# Patient Record
Sex: FEMALE | Race: WHITE | NOT HISPANIC OR LATINO | URBAN - METROPOLITAN AREA
[De-identification: names, ages, dates, MRNs, and addresses within clinical notes are randomized per-mention and may not be internally consistent; named-entity substitution may affect disease eponyms.]

---

## 2017-02-20 ENCOUNTER — OUTPATIENT (OUTPATIENT)
Dept: OUTPATIENT SERVICES | Facility: HOSPITAL | Age: 82
LOS: 1 days | Discharge: HOME | End: 2017-02-20

## 2017-06-27 DIAGNOSIS — R79.89 OTHER SPECIFIED ABNORMAL FINDINGS OF BLOOD CHEMISTRY: ICD-10-CM

## 2017-06-27 DIAGNOSIS — D50.9 IRON DEFICIENCY ANEMIA, UNSPECIFIED: ICD-10-CM

## 2017-06-27 DIAGNOSIS — E78.00 PURE HYPERCHOLESTEROLEMIA, UNSPECIFIED: ICD-10-CM

## 2017-06-27 DIAGNOSIS — E55.9 VITAMIN D DEFICIENCY, UNSPECIFIED: ICD-10-CM

## 2018-04-11 ENCOUNTER — OUTPATIENT (OUTPATIENT)
Dept: OUTPATIENT SERVICES | Facility: HOSPITAL | Age: 83
LOS: 1 days | Discharge: HOME | End: 2018-04-11

## 2018-04-11 DIAGNOSIS — D64.9 ANEMIA, UNSPECIFIED: ICD-10-CM

## 2018-04-11 DIAGNOSIS — R79.89 OTHER SPECIFIED ABNORMAL FINDINGS OF BLOOD CHEMISTRY: ICD-10-CM

## 2018-04-11 DIAGNOSIS — E55.9 VITAMIN D DEFICIENCY, UNSPECIFIED: ICD-10-CM

## 2018-04-11 DIAGNOSIS — E11.65 TYPE 2 DIABETES MELLITUS WITH HYPERGLYCEMIA: ICD-10-CM

## 2018-04-11 DIAGNOSIS — E78.2 MIXED HYPERLIPIDEMIA: ICD-10-CM

## 2018-04-11 DIAGNOSIS — E03.9 HYPOTHYROIDISM, UNSPECIFIED: ICD-10-CM

## 2018-04-11 DIAGNOSIS — E53.8 DEFICIENCY OF OTHER SPECIFIED B GROUP VITAMINS: ICD-10-CM

## 2018-06-04 ENCOUNTER — INPATIENT (INPATIENT)
Facility: HOSPITAL | Age: 83
LOS: 0 days | Discharge: HOME HEALTH | End: 2018-06-04
Attending: INTERNAL MEDICINE | Admitting: INTERNAL MEDICINE

## 2018-06-04 VITALS
OXYGEN SATURATION: 96 % | WEIGHT: 145.95 LBS | RESPIRATION RATE: 18 BRPM | SYSTOLIC BLOOD PRESSURE: 137 MMHG | TEMPERATURE: 97 F | HEART RATE: 95 BPM | DIASTOLIC BLOOD PRESSURE: 60 MMHG

## 2018-06-04 VITALS — SYSTOLIC BLOOD PRESSURE: 140 MMHG | DIASTOLIC BLOOD PRESSURE: 67 MMHG

## 2018-06-04 LAB
ALBUMIN SERPL ELPH-MCNC: 3.6 G/DL — SIGNIFICANT CHANGE UP (ref 3.5–5.2)
ALP SERPL-CCNC: 52 U/L — SIGNIFICANT CHANGE UP (ref 30–115)
ALT FLD-CCNC: 14 U/L — SIGNIFICANT CHANGE UP (ref 0–41)
ANION GAP SERPL CALC-SCNC: 12 MMOL/L — SIGNIFICANT CHANGE UP (ref 7–14)
APTT BLD: 29.5 SEC — SIGNIFICANT CHANGE UP (ref 27–39.2)
AST SERPL-CCNC: 26 U/L — SIGNIFICANT CHANGE UP (ref 0–41)
BASOPHILS # BLD AUTO: 0.06 K/UL — SIGNIFICANT CHANGE UP (ref 0–0.2)
BASOPHILS NFR BLD AUTO: 0.9 % — SIGNIFICANT CHANGE UP (ref 0–1)
BILIRUB DIRECT SERPL-MCNC: <0.2 MG/DL — SIGNIFICANT CHANGE UP (ref 0–0.2)
BILIRUB INDIRECT FLD-MCNC: >0.2 MG/DL — SIGNIFICANT CHANGE UP (ref 0.2–1.2)
BILIRUB SERPL-MCNC: 0.4 MG/DL — SIGNIFICANT CHANGE UP (ref 0.2–1.2)
BUN SERPL-MCNC: 34 MG/DL — HIGH (ref 10–20)
CALCIUM SERPL-MCNC: 9.8 MG/DL — SIGNIFICANT CHANGE UP (ref 8.5–10.1)
CHLORIDE SERPL-SCNC: 102 MMOL/L — SIGNIFICANT CHANGE UP (ref 98–110)
CO2 SERPL-SCNC: 29 MMOL/L — SIGNIFICANT CHANGE UP (ref 17–32)
CREAT SERPL-MCNC: 1.5 MG/DL — SIGNIFICANT CHANGE UP (ref 0.7–1.5)
EOSINOPHIL # BLD AUTO: 0.24 K/UL — SIGNIFICANT CHANGE UP (ref 0–0.7)
EOSINOPHIL NFR BLD AUTO: 3.6 % — SIGNIFICANT CHANGE UP (ref 0–8)
GLUCOSE SERPL-MCNC: 91 MG/DL — SIGNIFICANT CHANGE UP (ref 70–99)
HCT VFR BLD CALC: 52.7 % — HIGH (ref 37–47)
HGB BLD-MCNC: 17.4 G/DL — HIGH (ref 12–16)
IMM GRANULOCYTES NFR BLD AUTO: 0.4 % — HIGH (ref 0.1–0.3)
INR BLD: 1 RATIO — SIGNIFICANT CHANGE UP (ref 0.65–1.3)
LYMPHOCYTES # BLD AUTO: 1.7 K/UL — SIGNIFICANT CHANGE UP (ref 1.2–3.4)
LYMPHOCYTES # BLD AUTO: 25.4 % — SIGNIFICANT CHANGE UP (ref 20.5–51.1)
MCHC RBC-ENTMCNC: 31.6 PG — HIGH (ref 27–31)
MCHC RBC-ENTMCNC: 33 G/DL — SIGNIFICANT CHANGE UP (ref 32–37)
MCV RBC AUTO: 95.8 FL — SIGNIFICANT CHANGE UP (ref 81–99)
MONOCYTES # BLD AUTO: 0.57 K/UL — SIGNIFICANT CHANGE UP (ref 0.1–0.6)
MONOCYTES NFR BLD AUTO: 8.5 % — SIGNIFICANT CHANGE UP (ref 1.7–9.3)
NEUTROPHILS # BLD AUTO: 4.1 K/UL — SIGNIFICANT CHANGE UP (ref 1.4–6.5)
NEUTROPHILS NFR BLD AUTO: 61.2 % — SIGNIFICANT CHANGE UP (ref 42.2–75.2)
NRBC # BLD: 0 /100 WBCS — SIGNIFICANT CHANGE UP (ref 0–0)
PLATELET # BLD AUTO: 172 K/UL — SIGNIFICANT CHANGE UP (ref 130–400)
POTASSIUM SERPL-MCNC: 4.2 MMOL/L — SIGNIFICANT CHANGE UP (ref 3.5–5)
POTASSIUM SERPL-SCNC: 4.2 MMOL/L — SIGNIFICANT CHANGE UP (ref 3.5–5)
PROT SERPL-MCNC: 7 G/DL — SIGNIFICANT CHANGE UP (ref 6–8)
PROTHROM AB SERPL-ACNC: 10.8 SEC — SIGNIFICANT CHANGE UP (ref 9.95–12.87)
RBC # BLD: 5.5 M/UL — HIGH (ref 4.2–5.4)
RBC # FLD: 13.2 % — SIGNIFICANT CHANGE UP (ref 11.5–14.5)
SODIUM SERPL-SCNC: 143 MMOL/L — SIGNIFICANT CHANGE UP (ref 135–146)
WBC # BLD: 6.7 K/UL — SIGNIFICANT CHANGE UP (ref 4.8–10.8)
WBC # FLD AUTO: 6.7 K/UL — SIGNIFICANT CHANGE UP (ref 4.8–10.8)

## 2018-06-04 RX ORDER — MORPHINE SULFATE 50 MG/1
6 CAPSULE, EXTENDED RELEASE ORAL ONCE
Qty: 0 | Refills: 0 | Status: DISCONTINUED | OUTPATIENT
Start: 2018-06-04 | End: 2018-06-04

## 2018-06-04 RX ORDER — SODIUM CHLORIDE 9 MG/ML
1000 INJECTION, SOLUTION INTRAVENOUS ONCE
Qty: 0 | Refills: 0 | Status: COMPLETED | OUTPATIENT
Start: 2018-06-04 | End: 2018-06-04

## 2018-06-04 RX ORDER — HYDROMORPHONE HYDROCHLORIDE 2 MG/ML
0.5 INJECTION INTRAMUSCULAR; INTRAVENOUS; SUBCUTANEOUS ONCE
Qty: 0 | Refills: 0 | Status: DISCONTINUED | OUTPATIENT
Start: 2018-06-04 | End: 2018-06-04

## 2018-06-04 RX ADMIN — SODIUM CHLORIDE 2000 MILLILITER(S): 9 INJECTION, SOLUTION INTRAVENOUS at 10:55

## 2018-06-04 NOTE — ED PROVIDER NOTE - PHYSICAL EXAMINATION
VITAL SIGNS: I have reviewed nursing notes and confirm.  CONSTITUTIONAL: Elderly female laying on stretcher in NAD.   SKIN: Skin exam is warm and dry, no acute rash.  HEAD: Normocephalic.  EYES: PERRL, EOM intact; conjunctiva and sclera clear.  ENT: No nasal discharge; airway clear. TMs clear. (+) swelling and ecchymosis to nose. No septal hematoma.   NECK: Supple; non tender.  CARD: S1, S2 normal; no murmurs, gallops, or rubs. Regular rate and rhythm.  RESP: No wheezes, rales or rhonchi. Speaking in full sentences.   ABD: Normal bowel sounds; soft; non-distended; non-tender; No rebound or guarding.   EXT: Pelvis stable. (+) mild TTP to left knee without deformity, ecchymosis or erythema. FROM. Radial and DP pulse 2+.   NEURO: Alert, oriented. Grossly unremarkable. No focal deficits. CN II-XII intact. No dysmetria. Strength 5/5 throughout. Sensation intact.

## 2018-06-04 NOTE — ED PROVIDER NOTE - OBJECTIVE STATEMENT
86 yo F with PMHx of HTN, aFib, DM, and HLD presents to the ED s/p fall out of bed. Pt states she was dreaming and was reaching for something and ended up falling out of bed onto her face. Pt was unable to get up after calling and used her life alert to notify EMS. Pt denies fever, chills, nausea, vomiting, abdominal pain, diarrhea, headache, dizziness, chest pain, SOB, back pain, LOC, urinary symptoms, cough. 88 yo F with PMHx of HTN, A Fib, DM, and HLD presents to the ED s/p fall out of bed. Pt states she was dreaming and was reaching for something and ended up falling out of bed onto her face. Pt was unable to get up after calling and used her life alert to notify EMS. Pt denies fever, chills, nausea, vomiting, abdominal pain, diarrhea, headache, dizziness, chest pain, SOB, back pain, LOC, urinary symptoms, cough.

## 2018-06-04 NOTE — ED PROVIDER NOTE - MEDICAL DECISION MAKING DETAILS
I personally evaluated the patient. I reviewed the Resident’s or Physician Assistant’s note (as assigned above), and agree with the findings and plan except as documented in my note.  Chart reviewed. H/O DM, hypothyroid, afib, fell at home, states she was reaching for a child. Exam shows alert patient in no distress, HEENT swollen deformed nose with abrasion, neck non-tender, lungs clear, RR S1S2, abdomen soft NT +BS, +tenderness left shoulder, neuro A&OX3 no deficits. Will order pan CT scan,  CXR and re-assess.

## 2018-06-04 NOTE — ED ADULT NURSE NOTE - OBJECTIVE STATEMENT
patient states she was having a dream and leaned over and fell out of bed.  She hit her face and now has swelling on her nose.

## 2018-06-04 NOTE — ED PROVIDER NOTE - ATTENDING CONTRIBUTION TO CARE
See MDM section above for attending physician contribution to care. Patient s/p trip and fall.  Patient has nasal swelling and ecchymosis.  Trauma work up done by overnight team and labs sent.  Patient lives home alone and she is unable to ambulate in the ED.  She has knee pain. She is an unsafe discharge. Patient has [employer].    Patient will require admission for rehab evaluation and .  Dr. Marroquin evaluated patient at bedside and accepts admission. Patient s/p trip and fall.  Patient has nasal swelling and ecchymosis.  Trauma work up done by overnight team and labs sent.  Patient lives home alone and she is unable to ambulate in the ED.  She has knee pain. Patient has [employer]. Patient s/p trip and fall.  Patient has nasal swelling and ecchymosis.  Trauma work up done by overnight team and labs sent. Patient s/p trip and fall.  Patient has nasal swelling and ecchymosis.  Trauma work up done by overnight team and labs sent.    Patient lives home alone.  She has fired multiple home health aids.  At 7 am, patient was able to stand but concern for her walking.  Also concern for her home living situation.      Rehab consult placed.  I was able to get her next of kin number (Ronal Melendez - [phone]).  I spoke with Ronal in detail about ED work up.  He sees patient multiple times a week and is arranging Home Aid for several hours several times a week.    Patient brought over to rehab for evaluation.  Patient able to walk without issue with walker.  Initially patient was going to be admitted, but patient wants to go home.  Given the additional information from Ronal and Home Services (Meals on Wheels, impending aid), I believe best place for patient is to be at home.      I personally evaluated patient. I agree with the findings and plan with all documentation on chart except as documented  in my note.    Full DC instructions discussed and patient/Ronal knows when to seek immediate medical attention.  Patient has proper follow up with her PMD.  All results discussed and patient aware they may require further work up.  All questions and concerns from patient or family addressed. Understanding of instructions verbalized.

## 2018-06-04 NOTE — ED PROVIDER NOTE - PROGRESS NOTE DETAILS
Patient lives alone, risk to fall. Unsafe discharge. Will admit. Pt unable to ambulate, lives alone, frequent falls, unsafe discharge. Signed out to Dr. Romo. Dispo pending.

## 2018-06-04 NOTE — ED ADULT NURSE NOTE - PMH
Edema of both ankles  3 plus pitting  Edema of both legs  3 plus pitting  Shoulder pain, unspecified chronicity, unspecified laterality  left side as per patient

## 2018-06-04 NOTE — ED PROVIDER NOTE - CARE PLAN
Principal Discharge DX:	Inadequate social support  Secondary Diagnosis:	Unable to ambulate  Secondary Diagnosis:	Fall, initial encounter Principal Discharge DX:	Fall, initial encounter  Secondary Diagnosis:	Contusion of nose, initial encounter

## 2018-06-07 DIAGNOSIS — I10 ESSENTIAL (PRIMARY) HYPERTENSION: ICD-10-CM

## 2018-06-07 DIAGNOSIS — S00.33XA CONTUSION OF NOSE, INITIAL ENCOUNTER: ICD-10-CM
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
## 2018-06-07 DIAGNOSIS — I48.91 UNSPECIFIED ATRIAL FIBRILLATION: ICD-10-CM

## 2018-06-07 DIAGNOSIS — E11.9 TYPE 2 DIABETES MELLITUS WITHOUT COMPLICATIONS: ICD-10-CM
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
## 2018-06-07 DIAGNOSIS — Z04.3 ENCOUNTER FOR EXAMINATION AND OBSERVATION FOLLOWING OTHER ACCIDENT: ICD-10-CM

## 2018-06-07 DIAGNOSIS — E78.5 HYPERLIPIDEMIA, UNSPECIFIED: ICD-10-CM
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
## 2018-07-31 ENCOUNTER — INPATIENT (INPATIENT)
Facility: HOSPITAL | Age: 83
LOS: 7 days | Discharge: NURSING, REHAB OR LONG-TERM CARE | End: 2018-08-08
Attending: INTERNAL MEDICINE | Admitting: INTERNAL MEDICINE
Payer: MEDICARE

## 2018-07-31 VITALS
DIASTOLIC BLOOD PRESSURE: 63 MMHG | HEART RATE: 59 BPM | HEIGHT: 68 IN | RESPIRATION RATE: 18 BRPM | WEIGHT: 160.06 LBS | SYSTOLIC BLOOD PRESSURE: 107 MMHG | OXYGEN SATURATION: 92 %

## 2018-07-31 DIAGNOSIS — I10 ESSENTIAL (PRIMARY) HYPERTENSION: ICD-10-CM

## 2018-07-31 DIAGNOSIS — R41.0 DISORIENTATION, UNSPECIFIED: ICD-10-CM

## 2018-07-31 DIAGNOSIS — R60.0 LOCALIZED EDEMA: ICD-10-CM

## 2018-07-31 PROBLEM — M25.519 PAIN IN UNSPECIFIED SHOULDER: Chronic | Status: ACTIVE | Noted: 2018-06-04

## 2018-07-31 LAB
ALBUMIN SERPL ELPH-MCNC: 3.3 G/DL — LOW (ref 3.5–5.2)
ALP SERPL-CCNC: 47 U/L — SIGNIFICANT CHANGE UP (ref 30–115)
ALT FLD-CCNC: 9 U/L — SIGNIFICANT CHANGE UP (ref 0–41)
AMMONIA BLD-MCNC: 16 UMOL/L — SIGNIFICANT CHANGE UP (ref 11–55)
ANION GAP SERPL CALC-SCNC: 12 MMOL/L — SIGNIFICANT CHANGE UP (ref 7–14)
APPEARANCE UR: ABNORMAL
APTT BLD: 24.6 SEC — LOW (ref 27–39.2)
AST SERPL-CCNC: 21 U/L — SIGNIFICANT CHANGE UP (ref 0–41)
BACTERIA # UR AUTO: ABNORMAL
BASOPHILS # BLD AUTO: 0.07 K/UL — SIGNIFICANT CHANGE UP (ref 0–0.2)
BASOPHILS NFR BLD AUTO: 1 % — SIGNIFICANT CHANGE UP (ref 0–1)
BILIRUB DIRECT SERPL-MCNC: <0.2 MG/DL — SIGNIFICANT CHANGE UP (ref 0–0.2)
BILIRUB INDIRECT FLD-MCNC: >0.3 MG/DL — SIGNIFICANT CHANGE UP (ref 0.2–1.2)
BILIRUB SERPL-MCNC: 0.5 MG/DL — SIGNIFICANT CHANGE UP (ref 0.2–1.2)
BILIRUB UR-MCNC: NEGATIVE — SIGNIFICANT CHANGE UP
BUN SERPL-MCNC: 29 MG/DL — HIGH (ref 10–20)
CALCIUM SERPL-MCNC: 9.8 MG/DL — SIGNIFICANT CHANGE UP (ref 8.5–10.1)
CHLORIDE SERPL-SCNC: 102 MMOL/L — SIGNIFICANT CHANGE UP (ref 98–110)
CK SERPL-CCNC: 28 U/L — SIGNIFICANT CHANGE UP (ref 0–225)
CO2 SERPL-SCNC: 28 MMOL/L — SIGNIFICANT CHANGE UP (ref 17–32)
COD CRY URNS QL: NEGATIVE — SIGNIFICANT CHANGE UP
COLOR SPEC: YELLOW — SIGNIFICANT CHANGE UP
CREAT SERPL-MCNC: 1.3 MG/DL — SIGNIFICANT CHANGE UP (ref 0.7–1.5)
DIFF PNL FLD: ABNORMAL
EOSINOPHIL # BLD AUTO: 0.23 K/UL — SIGNIFICANT CHANGE UP (ref 0–0.7)
EOSINOPHIL NFR BLD AUTO: 3.2 % — SIGNIFICANT CHANGE UP (ref 0–8)
EPI CELLS # UR: ABNORMAL /HPF
GLUCOSE SERPL-MCNC: 110 MG/DL — HIGH (ref 70–99)
GLUCOSE UR QL: NEGATIVE MG/DL — SIGNIFICANT CHANGE UP
GRAN CASTS # UR COMP ASSIST: NEGATIVE — SIGNIFICANT CHANGE UP
HCT VFR BLD CALC: 47.3 % — HIGH (ref 37–47)
HGB BLD-MCNC: 15.7 G/DL — SIGNIFICANT CHANGE UP (ref 12–16)
HYALINE CASTS # UR AUTO: NEGATIVE — SIGNIFICANT CHANGE UP
IMM GRANULOCYTES NFR BLD AUTO: 0.6 % — HIGH (ref 0.1–0.3)
INR BLD: 1.1 RATIO — SIGNIFICANT CHANGE UP (ref 0.65–1.3)
KETONES UR-MCNC: NEGATIVE — SIGNIFICANT CHANGE UP
LACTATE SERPL-SCNC: 1.2 MMOL/L — SIGNIFICANT CHANGE UP (ref 0.5–2.2)
LEUKOCYTE ESTERASE UR-ACNC: ABNORMAL
LIDOCAIN IGE QN: 31 U/L — SIGNIFICANT CHANGE UP (ref 7–60)
LYMPHOCYTES # BLD AUTO: 0.66 K/UL — LOW (ref 1.2–3.4)
LYMPHOCYTES # BLD AUTO: 9.3 % — LOW (ref 20.5–51.1)
MCHC RBC-ENTMCNC: 32.1 PG — HIGH (ref 27–31)
MCHC RBC-ENTMCNC: 33.2 G/DL — SIGNIFICANT CHANGE UP (ref 32–37)
MCV RBC AUTO: 96.7 FL — SIGNIFICANT CHANGE UP (ref 81–99)
MONOCYTES # BLD AUTO: 0.63 K/UL — HIGH (ref 0.1–0.6)
MONOCYTES NFR BLD AUTO: 8.9 % — SIGNIFICANT CHANGE UP (ref 1.7–9.3)
NEUTROPHILS # BLD AUTO: 5.47 K/UL — SIGNIFICANT CHANGE UP (ref 1.4–6.5)
NEUTROPHILS NFR BLD AUTO: 77 % — HIGH (ref 42.2–75.2)
NITRITE UR-MCNC: NEGATIVE — SIGNIFICANT CHANGE UP
NRBC # BLD: 0 /100 WBCS — SIGNIFICANT CHANGE UP (ref 0–0)
PH UR: 6 — SIGNIFICANT CHANGE UP (ref 5–8)
PLATELET # BLD AUTO: 170 K/UL — SIGNIFICANT CHANGE UP (ref 130–400)
POTASSIUM SERPL-MCNC: 4.3 MMOL/L — SIGNIFICANT CHANGE UP (ref 3.5–5)
POTASSIUM SERPL-SCNC: 4.3 MMOL/L — SIGNIFICANT CHANGE UP (ref 3.5–5)
PROT SERPL-MCNC: 6.5 G/DL — SIGNIFICANT CHANGE UP (ref 6–8)
PROT UR-MCNC: ABNORMAL MG/DL
PROTHROM AB SERPL-ACNC: 11.9 SEC — SIGNIFICANT CHANGE UP (ref 9.95–12.87)
RBC # BLD: 4.89 M/UL — SIGNIFICANT CHANGE UP (ref 4.2–5.4)
RBC # FLD: 13 % — SIGNIFICANT CHANGE UP (ref 11.5–14.5)
RBC CASTS # UR COMP ASSIST: ABNORMAL /HPF
SODIUM SERPL-SCNC: 142 MMOL/L — SIGNIFICANT CHANGE UP (ref 135–146)
SP GR SPEC: 1.02 — SIGNIFICANT CHANGE UP (ref 1.01–1.03)
TRI-PHOS CRY UR QL COMP ASSIST: NEGATIVE — SIGNIFICANT CHANGE UP
TROPONIN T SERPL-MCNC: <0.01 NG/ML — SIGNIFICANT CHANGE UP
URATE CRY FLD QL MICRO: NEGATIVE — SIGNIFICANT CHANGE UP
UROBILINOGEN FLD QL: 0.2 MG/DL — SIGNIFICANT CHANGE UP (ref 0.2–0.2)
WBC # BLD: 7.1 K/UL — SIGNIFICANT CHANGE UP (ref 4.8–10.8)
WBC # FLD AUTO: 7.1 K/UL — SIGNIFICANT CHANGE UP (ref 4.8–10.8)
WBC UR QL: ABNORMAL /HPF

## 2018-07-31 RX ORDER — METOPROLOL TARTRATE 50 MG
50 TABLET ORAL DAILY
Qty: 0 | Refills: 0 | Status: DISCONTINUED | OUTPATIENT
Start: 2018-07-31 | End: 2018-08-05

## 2018-07-31 RX ORDER — SIMVASTATIN 20 MG/1
20 TABLET, FILM COATED ORAL AT BEDTIME
Qty: 0 | Refills: 0 | Status: DISCONTINUED | OUTPATIENT
Start: 2018-07-31 | End: 2018-08-08

## 2018-07-31 RX ORDER — PANTOPRAZOLE SODIUM 20 MG/1
40 TABLET, DELAYED RELEASE ORAL
Qty: 0 | Refills: 0 | Status: DISCONTINUED | OUTPATIENT
Start: 2018-07-31 | End: 2018-07-31

## 2018-07-31 RX ORDER — SODIUM CHLORIDE 9 MG/ML
3 INJECTION INTRAMUSCULAR; INTRAVENOUS; SUBCUTANEOUS ONCE
Qty: 0 | Refills: 0 | Status: COMPLETED | OUTPATIENT
Start: 2018-07-31 | End: 2018-07-31

## 2018-07-31 RX ORDER — HEPARIN SODIUM 5000 [USP'U]/ML
5000 INJECTION INTRAVENOUS; SUBCUTANEOUS EVERY 12 HOURS
Qty: 0 | Refills: 0 | Status: DISCONTINUED | OUTPATIENT
Start: 2018-07-31 | End: 2018-08-08

## 2018-07-31 RX ORDER — HALOPERIDOL DECANOATE 100 MG/ML
0.5 INJECTION INTRAMUSCULAR
Qty: 0 | Refills: 0 | Status: DISCONTINUED | OUTPATIENT
Start: 2018-07-31 | End: 2018-08-08

## 2018-07-31 RX ORDER — FUROSEMIDE 40 MG
20 TABLET ORAL DAILY
Qty: 0 | Refills: 0 | Status: COMPLETED | OUTPATIENT
Start: 2018-07-31 | End: 2018-08-05

## 2018-07-31 RX ORDER — PANTOPRAZOLE SODIUM 20 MG/1
40 TABLET, DELAYED RELEASE ORAL
Qty: 0 | Refills: 0 | Status: DISCONTINUED | OUTPATIENT
Start: 2018-07-31 | End: 2018-08-08

## 2018-07-31 RX ORDER — ASPIRIN/CALCIUM CARB/MAGNESIUM 324 MG
81 TABLET ORAL DAILY
Qty: 0 | Refills: 0 | Status: DISCONTINUED | OUTPATIENT
Start: 2018-07-31 | End: 2018-08-08

## 2018-07-31 RX ORDER — ACETAMINOPHEN 500 MG
650 TABLET ORAL EVERY 6 HOURS
Qty: 0 | Refills: 0 | Status: DISCONTINUED | OUTPATIENT
Start: 2018-07-31 | End: 2018-08-08

## 2018-07-31 RX ORDER — SERTRALINE 25 MG/1
100 TABLET, FILM COATED ORAL DAILY
Qty: 0 | Refills: 0 | Status: DISCONTINUED | OUTPATIENT
Start: 2018-07-31 | End: 2018-08-08

## 2018-07-31 RX ORDER — HEPARIN SODIUM 5000 [USP'U]/ML
5000 INJECTION INTRAVENOUS; SUBCUTANEOUS EVERY 12 HOURS
Qty: 0 | Refills: 0 | Status: DISCONTINUED | OUTPATIENT
Start: 2018-07-31 | End: 2018-07-31

## 2018-07-31 RX ADMIN — SODIUM CHLORIDE 3 MILLILITER(S): 9 INJECTION INTRAMUSCULAR; INTRAVENOUS; SUBCUTANEOUS at 05:06

## 2018-07-31 RX ADMIN — Medication 20 MILLIGRAM(S): at 16:44

## 2018-07-31 RX ADMIN — SIMVASTATIN 20 MILLIGRAM(S): 20 TABLET, FILM COATED ORAL at 21:39

## 2018-07-31 RX ADMIN — SERTRALINE 100 MILLIGRAM(S): 25 TABLET, FILM COATED ORAL at 16:44

## 2018-07-31 RX ADMIN — HEPARIN SODIUM 5000 UNIT(S): 5000 INJECTION INTRAVENOUS; SUBCUTANEOUS at 17:21

## 2018-07-31 RX ADMIN — Medication 81 MILLIGRAM(S): at 16:44

## 2018-07-31 RX ADMIN — PANTOPRAZOLE SODIUM 40 MILLIGRAM(S): 20 TABLET, DELAYED RELEASE ORAL at 16:44

## 2018-07-31 NOTE — PHYSICAL THERAPY INITIAL EVALUATION ADULT - IMPAIRMENTS FOUND, PT EVAL
ergonomics and body mechanics/gait, locomotion, and balance/posture/poor safety awareness/aerobic capacity/endurance/muscle strength

## 2018-07-31 NOTE — ED ADULT TRIAGE NOTE - CHIEF COMPLAINT QUOTE
pt BIBA for visual hallucination, as per EMS pt states there are people in her house and kids in her bedroom.

## 2018-07-31 NOTE — ED ADULT NURSE NOTE - NSIMPLEMENTINTERV_GEN_ALL_ED
Implemented All Fall with Harm Risk Interventions:  [place] to call system. Call bell, personal items and telephone within reach. Instruct patient to call for assistance. Room bathroom lighting operational. Non-slip footwear when patient is off stretcher. Physically safe environment: no spills, clutter or unnecessary equipment. Stretcher in lowest position, wheels locked, appropriate side rails in place. Provide visual cue, wrist band, yellow gown, etc. Monitor gait and stability. Monitor for mental status changes and reorient to person, place, and time. Review medications for side effects contributing to fall risk. Reinforce activity limits and safety measures with patient and family. Provide visual clues: red socks.
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
## 2018-07-31 NOTE — CONSULT NOTE ADULT - ASSESSMENT
dementia moderate to severe   no evidence of delirium    haldo 0.5 mg po q 12 prn  case management for safe discharge plan

## 2018-07-31 NOTE — ED PROVIDER NOTE - MEDICAL DECISION MAKING DETAILS
87yF  lives alone  citlalli ms after she called 911 to say  people were in her house -  pt also say she sees dead people  -  no fall no somatic complaints vomiting diarrhea dysuria   -  labs  ct reviewed - estrella almaraz-  pt unsafe discharge
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
## 2018-07-31 NOTE — ED PROVIDER NOTE - NS ED ROS FT
Constitutional: (-) fever  Eyes/ENT: (-) blurry vision, (-) epistaxis  Cardiovascular: (-) chest pain, (-) syncope  Respiratory: (-) cough, (-) shortness of breath  Gastrointestinal: (-) vomiting, (-) diarrhea  Musculoskeletal: (-) neck pain, (-) back pain, (-) joint pain  Integumentary: (-) rash, (-) edema  Neurological: (-) headache, (-) altered mental status  Psychiatric: (+) hallucinations  Allergic/Immunologic: (-) pruritus

## 2018-07-31 NOTE — CONSULT NOTE ADULT - SUBJECTIVE AND OBJECTIVE BOX
HPI:  pt was   brought in by ambulance because pt was seeing dead people in her  house delurium  ptn  is  stable  and  referred to  acute rehab  and  psychiatry     no Fever, head trauma, cp, sob, n/v/d, witnessed seizures n/a.   PTN  REFERRED TO ACUTE  REHAB  FOR  EVAL AND  TX   PAST MEDICAL & SURGICAL HISTORY:  Hypertension  High blood cholesterol  Edema of both ankles: 3 plus pitting  Edema of both legs: 3 plus pitting  Shoulder pain, unspecified chronicity, unspecified laterality: left side as per patient      Hospital Course:    TODAY'S SUBJECTIVE & REVIEW OF SYMPTOMS:     Constitutional WNL   Cardio WNL   Resp WNL   GI WNL  Heme WNL  Endo WNL  Skin WNL  MSK WNL  Neuro WNL  Cognitive WNL  Psych WNL      MEDICATIONS  (STANDING):  aspirin  chewable 81 milliGRAM(s) Oral daily  furosemide    Tablet 20 milliGRAM(s) Oral daily  heparin  Injectable 5000 Unit(s) SubCutaneous every 12 hours  metoprolol succinate ER 50 milliGRAM(s) Oral daily  multivitamin  Drops 5 milliLiter(s) Oral daily  pantoprazole    Tablet 40 milliGRAM(s) Oral before breakfast  sertraline 100 milliGRAM(s) Oral daily  simvastatin 20 milliGRAM(s) Oral at bedtime    MEDICATIONS  (PRN):  acetaminophen   Tablet. 650 milliGRAM(s) Oral every 6 hours PRN Moderate Pain (4 - 6)  haloperidol     Tablet 0.5 milliGRAM(s) Oral two times a day PRN agitation      FAMILY HISTORY:  No pertinent family history in first degree relatives      Allergies    No Known Allergies    Intolerances        SOCIAL HISTORY:    [  ] Etoh  [  ] Smoking  [  ] Substance abuse     Home Environment:  [ x ] Home Alone  [  ] Lives with Family  [  ] Home Health Aid    Dwelling:  [  ] Apartment  [ x ] Private House  [  ] Adult Home  [  ] Skilled Nursing Facility      [  ] Short Term  [  ] Long Term  [ x ] Stairs       Elevator [  ]    FUNCTIONAL STATUS PTA: (Check all that apply)  Ambulation: [ x  ]Independent    [  ] Dependent     [  ] Non-Ambulatory  Assistive Device: [  ] SA Cane  [  ]  Q Cane  [ x ] Walker  [  ]  Wheelchair  ADL : [ x ] Independent  [  ]  Dependent       Vital Signs Last 24 Hrs  T(C): 35.7 (2018 09:10), Max: 36.6 (2018 07:03)  T(F): 96.2 (2018 09:10), Max: 97.9 (2018 07:03)  HR: 126 (2018 09:10) (59 - 126)  BP: 131/86 (2018 09:10) (107/63 - 131/86)  BP(mean): --  RR: 17 (2018 09:10) (17 - 18)  SpO2: 98% (2018 07:03) (92% - 98%)      PHYSICAL EXAM: Alert & Oriented X 1  GENERAL: NAD, well-groomed, well-developed  HEAD:  Atraumatic, Normocephalic  EYES: EOMI, PERRLA, conjunctiva and sclera clear  NECK: Supple, No JVD, Normal thyroid  CHEST/LUNG: Clear to percussion bilaterally; No rales, rhonchi, wheezing, or rubs  HEART: Regular rate and rhythm; No murmurs, rubs, or gallops  ABDOMEN: Soft, Nontender, Nondistended; Bowel sounds present  EXTREMITIES:  2+ Peripheral Pulses, No clubbing, cyanosis, or edema    NERVOUS SYSTEM:  Cranial Nerves 2-12 intact [ x ] Abnormal  [  ]  ROM: WFL all extremities [x  ]  Abnormal [  ]  Motor Strength: WFL all extremities  [  ]  Abnormal [  ]  Sensation: intact to light touch [ x ] Abnormal [  ]  Reflexes: Symmetric [ x ]  Abnormal [  ]    FUNCTIONAL STATUS:  Bed Mobility: Independent [  ]  Supervision [  ]  Needs Assistance [x  ]  N/A [  ]  Transfers: Independent [  ]  Supervision [  ]  Needs Assistance [ x ]  N/A [  ]   Ambulation: Independent [  ]  Supervision [  ]  Needs Assistance [x  ]  N/A [  ]  ADL: Independent [ x ] Requires Assistance [  ] N/A [  ]      LABS:                        15.7   7.10  )-----------( 170      ( 2018 02:12 )             47.3         142  |  102  |  29<H>  ----------------------------<  110<H>  4.3   |  28  |  1.3    Ca    9.8      2018 02:12    TPro  6.5  /  Alb  3.3<L>  /  TBili  0.5  /  DBili  <0.2  /  AST  21  /  ALT  9   /  AlkPhos  47      PT/INR - ( 2018 02:12 )   PT: 11.90 sec;   INR: 1.10 ratio         PTT - ( 2018 02:12 )  PTT:24.6 sec  Urinalysis Basic - ( 2018 06:04 )    Color: Yellow / Appearance: Slightly Cloudy / S.020 / pH: x  Gluc: x / Ketone: Negative  / Bili: Negative / Urobili: 0.2 mg/dL   Blood: x / Protein: Trace mg/dL / Nitrite: Negative   Leuk Esterase: Small / RBC: 2-5 /HPF / WBC 6-10 /HPF   Sq Epi: x / Non Sq Epi: Many /HPF / Bacteria: Many        RADIOLOGY & ADDITIONAL STUDIES:< from: CT Head No Cont (18 @ 02:35) >  No evidence of intracranial hemorrhage, territorial infarct, or mass   effect.    Chronic microvascular ischemic changes.    Stable trace opacification of the left mastoid air cells.      < end of copied text >      Assesment:

## 2018-07-31 NOTE — ED ADULT NURSE NOTE - PMH
Edema of both ankles  3 plus pitting  Edema of both legs  3 plus pitting  High blood cholesterol    Hypertension    Shoulder pain, unspecified chronicity, unspecified laterality  left side as per patient

## 2018-07-31 NOTE — H&P ADULT - NSHPLABSRESULTS_GEN_ALL_CORE
15.7   7.10  )-----------( 170      ( 2018 02:12 )             47.3         142  |  102  |  29<H>  ----------------------------<  110<H>  4.3   |  28  |  1.3    Ca    9.8      2018 02:12    TPro  6.5  /  Alb  3.3<L>  /  TBili  0.5  /  DBili  <0.2  /  AST  21  /  ALT  9   /  AlkPhos  47            Urinalysis Basic - ( 2018 06:04 )    Color: Yellow / Appearance: Slightly Cloudy / S.020 / pH: x  Gluc: x / Ketone: Negative  / Bili: Negative / Urobili: 0.2 mg/dL   Blood: x / Protein: Trace mg/dL / Nitrite: Negative   Leuk Esterase: Small / RBC: 2-5 /HPF / WBC 6-10 /HPF   Sq Epi: x / Non Sq Epi: Many /HPF / Bacteria: Many      PT/INR - ( 2018 02:12 )   PT: 11.90 sec;   INR: 1.10 ratio         PTT - ( 2018 02:12 )  PTT:24.6 sec  Lactate Trend   @ 02:12 Lactate:1.2     CARDIAC MARKERS ( 2018 02:12 )  x     / <0.01 ng/mL / 28 U/L / x     / x          CAPILLARY BLOOD GLUCOSE

## 2018-07-31 NOTE — CONSULT NOTE ADULT - ASSESSMENT
IMPRESSION: Rehab of 88 y/o  f  rehab  for  debility    PRECAUTIONS: [  ] Cardiac  [  ] Respiratory  [  ] Seizures [  ] Contact Isolation  [  ] Droplet Isolation  [x  ] Other fall    Weight Bearing Status:     RECOMMENDATION:    Out of Bed to Chair     DVT/Decubiti Prophylaxis    REHAB PLAN:     [  xx ] Bedside P/T 3-5 times a week   [   ]   Bedside O/T  2-3 times a week             [   ] No Rehab Therapy Indicated                   [   ]  Speech Therapy   Conditioning/ROM                                    ADL  Bed Mobility                                               Conditioning/ROM  Transfers                                                     Bed Mobility  Sitting /Standing Balance                         Transfers                                        Gait Training                                               Sitting/Standing Balance  Stair Training [   ]Applicable                    Home equipment Eval                                                                        Splinting  [   ] Only      GOALS:   ADL   [ x]   Independent                    Transfers  [ x  ] Independent                          Ambulation  [ x ] Independent     [  x] With device                            [   ]  CG                                                         [   ]  CG                                                                  [   ] CG                            [    ] Min A                                                   [   ] Min A                                                              [   ] Min  A          DISCHARGE PLAN:   [   ]  Good candidate for Intensive Rehabilitation/Hospital based-4A [facility]                                             Will tolerate 3hrs Intensive Rehab Daily                                       [  xx  ]  Short Term Rehab in Skilled Nursing Facility                                       [ x ]  Home with Outpatient or VN services may  need LTC vs  home  aid                                          [    ]  Possible Candidate for Intensive Hospital based Rehab (0) independent

## 2018-07-31 NOTE — ED PROVIDER NOTE - PHYSICAL EXAMINATION
Physical Exam    Vital Signs: I have reviewed the initial vital signs.  Constitutional: elderly and frail, no acute distress  Eyes: Conjunctiva pink, Sclera clear, PERRLA, EOMI.  Cardiovascular: S1 and S2, regular rate, regular rhythm, well-perfused extremities, radial pulses equal and 2+  Respiratory: unlabored respiratory effort, clear to auscultation bilaterally no wheezing, rales and rhonchi  Gastrointestinal: soft, non-tender abdomen, no pulsatile mass, normal bowl sounds  Musculoskeletal: supple neck, + lower extremity edema, no midline tenderness  Integumentary: warm, dry, no rash  Neurologic: awake, alert, cranial nerves II-XII grossly intact, extremities’ motor and sensory functions grossly intact  Psychiatric: appropriate mood, appropriate affect Physical Exam    Vital Signs: I have reviewed the initial vital signs.  Constitutional: elderly and frail, no acute distress  Eyes: Conjunctiva pink, Sclera clear, PERRLA, EOMI. head atraumatic  Cardiovascular: S1 and S2, regular rate, regular rhythm, well-perfused extremities, radial pulses equal and 2+  Respiratory: unlabored respiratory effort, clear to auscultation bilaterally no wheezing, rales and rhonchi  Gastrointestinal: soft, non-tender abdomen, no pulsatile mass, normal bowl sounds  Musculoskeletal: supple neck, + lower extremity edema, no midline tenderness  Integumentary: warm, dry, no rash  Neurologic: awake, alert, cranial nerves II-XII grossly intact, extremities’ motor and sensory functions grossly intact  Psychiatric: appropriate mood, appropriate affect

## 2018-07-31 NOTE — H&P ADULT - NSHPREVIEWOFSYSTEMS_GEN_ALL_CORE
REVIEW OF SYSTEMS:      CONSTITUTIONAL:     fever  n/a  EYES/ENT:       NECK:       RESPIRATORY:       CARDIOVASCULAR:       GASTROINTESTINAL:       GENITOURINARY:        NEUROLOGICAL:       no witnessed seizures  SKIN: REVIEW OF SYSTEMS:      CONSTITUTIONAL:     fever  n/a  EYES/ENT:       NECK:       RESPIRATORY:       CARDIOVASCULAR:     h/o htn +  GASTROINTESTINAL:       GENITOURINARY:        NEUROLOGICAL:       no witnessed seizures  SKIN:

## 2018-07-31 NOTE — H&P ADULT - HISTORY OF PRESENT ILLNESS
pt was   brought in by ambulance because pt was seeing dead people in her  house                                      Fever, head trauma, cp, sob, n/v/d, witnessed seizures n/a
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
## 2018-07-31 NOTE — ED PROVIDER NOTE - ATTENDING CONTRIBUTION TO CARE
I personally evaluated the patient. I reviewed the Resident’s or Physician Assistant’s note (as assigned above), and agree with the findings and plan ,   plan  ct head  labs  urine - admit

## 2018-07-31 NOTE — ED ADULT NURSE NOTE - CHIEF COMPLAINT
The patient is a 87y Female complaining of hallucinations. The patient is a [AgeY] [Gender] complaining of [CCCP trg chief cmplnt].

## 2018-08-01 LAB
ANION GAP SERPL CALC-SCNC: 11 MMOL/L — SIGNIFICANT CHANGE UP (ref 7–14)
BUN SERPL-MCNC: 24 MG/DL — HIGH (ref 10–20)
CALCIUM SERPL-MCNC: 9.4 MG/DL — SIGNIFICANT CHANGE UP (ref 8.5–10.1)
CHLORIDE SERPL-SCNC: 106 MMOL/L — SIGNIFICANT CHANGE UP (ref 98–110)
CO2 SERPL-SCNC: 27 MMOL/L — SIGNIFICANT CHANGE UP (ref 17–32)
CREAT SERPL-MCNC: 1.2 MG/DL — SIGNIFICANT CHANGE UP (ref 0.7–1.5)
GLUCOSE SERPL-MCNC: 92 MG/DL — SIGNIFICANT CHANGE UP (ref 70–99)
HCT VFR BLD CALC: 45.8 % — SIGNIFICANT CHANGE UP (ref 37–47)
HGB BLD-MCNC: 15.5 G/DL — SIGNIFICANT CHANGE UP (ref 12–16)
MCHC RBC-ENTMCNC: 32.2 PG — HIGH (ref 27–31)
MCHC RBC-ENTMCNC: 33.8 G/DL — SIGNIFICANT CHANGE UP (ref 32–37)
MCV RBC AUTO: 95.2 FL — SIGNIFICANT CHANGE UP (ref 81–99)
NRBC # BLD: 0 /100 WBCS — SIGNIFICANT CHANGE UP (ref 0–0)
PLATELET # BLD AUTO: 186 K/UL — SIGNIFICANT CHANGE UP (ref 130–400)
POTASSIUM SERPL-MCNC: 3.5 MMOL/L — SIGNIFICANT CHANGE UP (ref 3.5–5)
POTASSIUM SERPL-SCNC: 3.5 MMOL/L — SIGNIFICANT CHANGE UP (ref 3.5–5)
RBC # BLD: 4.81 M/UL — SIGNIFICANT CHANGE UP (ref 4.2–5.4)
RBC # FLD: 12.9 % — SIGNIFICANT CHANGE UP (ref 11.5–14.5)
SODIUM SERPL-SCNC: 144 MMOL/L — SIGNIFICANT CHANGE UP (ref 135–146)
WBC # BLD: 6.5 K/UL — SIGNIFICANT CHANGE UP (ref 4.8–10.8)
WBC # FLD AUTO: 6.5 K/UL — SIGNIFICANT CHANGE UP (ref 4.8–10.8)

## 2018-08-01 PROCEDURE — 99221 1ST HOSP IP/OBS SF/LOW 40: CPT

## 2018-08-01 RX ADMIN — Medication 20 MILLIGRAM(S): at 05:00

## 2018-08-01 RX ADMIN — PANTOPRAZOLE SODIUM 40 MILLIGRAM(S): 20 TABLET, DELAYED RELEASE ORAL at 05:01

## 2018-08-01 RX ADMIN — HALOPERIDOL DECANOATE 0.5 MILLIGRAM(S): 100 INJECTION INTRAMUSCULAR at 23:00

## 2018-08-01 RX ADMIN — Medication 50 MILLIGRAM(S): at 05:00

## 2018-08-01 RX ADMIN — SIMVASTATIN 20 MILLIGRAM(S): 20 TABLET, FILM COATED ORAL at 22:11

## 2018-08-01 RX ADMIN — HEPARIN SODIUM 5000 UNIT(S): 5000 INJECTION INTRAVENOUS; SUBCUTANEOUS at 05:01

## 2018-08-01 RX ADMIN — HEPARIN SODIUM 5000 UNIT(S): 5000 INJECTION INTRAVENOUS; SUBCUTANEOUS at 17:14

## 2018-08-01 NOTE — CONSULT NOTE ADULT - SUBJECTIVE AND OBJECTIVE BOX
S :   87y year old Female seen at bedside with a chief complaint of   painful thickened, dystrophic, ingrowing  and long toenails digits 1-5 bilaterally  and preventative foot examination. Patient is medically managed  by Medicine/Hospitalists.  Patient denies any history o f trauma to both feet.  Patient has no other pedal complaints.  Patient is experiencing pain while standing, walking and in shoe gear.       Patient admits to  (-) Fevers, (-) Chills, (-) Nausea, (-) Vomiting, (-) Shortness of Breath (-) calf pain (-) chest pain       PMH: Hypertension  High blood cholesterol  Edema of both ankles  Edema of both legs  Shoulder pain, unspecified chronicity, unspecified laterality    PSH:    Allergies:No Known Allergies      Labs:                        15.5   6.50  )-----------( 186      ( 01 Aug 2018 07:00 )             45.8       WBC Trend  6.50 Date (08-01 @ 07:00)  7.10 Date (07-31 @ 02:12)      Chem  08-01    144  |  106  |  24<H>  ----------------------------<  92  3.5   |  27  |  1.2    Ca    9.4      01 Aug 2018 07:00    TPro  6.5  /  Alb  3.3<L>  /  TBili  0.5  /  DBili  <0.2  /  AST  21  /  ALT  9   /  AlkPhos  47  07-31          T(F): 96.8 (08-01-18 @ 04:56), Max: 96.8 (08-01-18 @ 04:56)  HR: 115 (08-01-18 @ 04:56) (110 - 135)  BP: 136/91 (08-01-18 @ 04:56) (128/81 - 139/70)  RR: 16 (08-01-18 @ 04:56) (16 - 17)  SpO2: --  Wt(kg): --    O:   Integument:  Skin warm, dry and supple bilateral.  No open lesions or inter-digital macerations noted bilateral.   Toenails 1-5 Right and Left feet thickened, elongated, discolored, and dystrophic with subungual debris. There is pain upon palpation of all fungal and ingrowing nails 1-5 bilaterally.   Vascular: Dorsalis Pedis and Posterior Tibial pulses -/4.  Capillary re-fill time less than 3 seconds digits 1-5 bilateral. Neuro: Protective sensation intact to the level of the digits bilateral.  MSK: Muscle strength 3/5 all major muscle groups bilateral. No structural abnormality, bilaterally

## 2018-08-01 NOTE — CONSULT NOTE ADULT - ASSESSMENT
Assessment:   1. bilateral hypertrohic Onychomycosis digits 1-5   2. Pain from Elongated nails, ingrowing  and dystrophic nails    Plan:   - pt seen/evaluated @ bedside w/ attending   - Aseptic debridement and curretage  of all fungal and ingrowing  nails 1-5 bilateral with sterile nail pack  - Please re-consult as needed.

## 2018-08-02 LAB
T3 SERPL-MCNC: 70 NG/DL — LOW (ref 80–200)
T4 AB SER-ACNC: 5.7 UG/DL — SIGNIFICANT CHANGE UP (ref 4.6–12)
TSH SERPL-MCNC: 1.79 UIU/ML — SIGNIFICANT CHANGE UP (ref 0.27–4.2)

## 2018-08-02 RX ADMIN — PANTOPRAZOLE SODIUM 40 MILLIGRAM(S): 20 TABLET, DELAYED RELEASE ORAL at 06:27

## 2018-08-02 RX ADMIN — Medication 81 MILLIGRAM(S): at 11:18

## 2018-08-02 RX ADMIN — HEPARIN SODIUM 5000 UNIT(S): 5000 INJECTION INTRAVENOUS; SUBCUTANEOUS at 06:27

## 2018-08-02 RX ADMIN — SIMVASTATIN 20 MILLIGRAM(S): 20 TABLET, FILM COATED ORAL at 21:15

## 2018-08-02 RX ADMIN — Medication 20 MILLIGRAM(S): at 06:27

## 2018-08-02 RX ADMIN — Medication 50 MILLIGRAM(S): at 06:29

## 2018-08-02 RX ADMIN — HEPARIN SODIUM 5000 UNIT(S): 5000 INJECTION INTRAVENOUS; SUBCUTANEOUS at 17:19

## 2018-08-02 RX ADMIN — Medication 1 TABLET(S): at 11:18

## 2018-08-02 RX ADMIN — SERTRALINE 100 MILLIGRAM(S): 25 TABLET, FILM COATED ORAL at 11:18

## 2018-08-03 DIAGNOSIS — I49.9 CARDIAC ARRHYTHMIA, UNSPECIFIED: ICD-10-CM

## 2018-08-03 DIAGNOSIS — F03.91 UNSPECIFIED DEMENTIA WITH BEHAVIORAL DISTURBANCE: ICD-10-CM

## 2018-08-03 LAB
TROPONIN T SERPL-MCNC: <0.01 NG/ML — SIGNIFICANT CHANGE UP
TROPONIN T SERPL-MCNC: <0.01 NG/ML — SIGNIFICANT CHANGE UP

## 2018-08-03 RX ADMIN — Medication 50 MILLIGRAM(S): at 05:56

## 2018-08-03 RX ADMIN — Medication 20 MILLIGRAM(S): at 05:56

## 2018-08-03 RX ADMIN — Medication 81 MILLIGRAM(S): at 11:16

## 2018-08-03 RX ADMIN — Medication 1 TABLET(S): at 11:16

## 2018-08-03 RX ADMIN — SIMVASTATIN 20 MILLIGRAM(S): 20 TABLET, FILM COATED ORAL at 22:54

## 2018-08-03 RX ADMIN — SERTRALINE 100 MILLIGRAM(S): 25 TABLET, FILM COATED ORAL at 11:16

## 2018-08-03 RX ADMIN — HEPARIN SODIUM 5000 UNIT(S): 5000 INJECTION INTRAVENOUS; SUBCUTANEOUS at 05:56

## 2018-08-03 RX ADMIN — PANTOPRAZOLE SODIUM 40 MILLIGRAM(S): 20 TABLET, DELAYED RELEASE ORAL at 05:56

## 2018-08-04 DIAGNOSIS — I48.91 UNSPECIFIED ATRIAL FIBRILLATION: ICD-10-CM

## 2018-08-04 LAB
TROPONIN T SERPL-MCNC: <0.01 NG/ML — SIGNIFICANT CHANGE UP
TSH SERPL-MCNC: 1.51 UIU/ML — SIGNIFICANT CHANGE UP (ref 0.27–4.2)

## 2018-08-04 RX ADMIN — SIMVASTATIN 20 MILLIGRAM(S): 20 TABLET, FILM COATED ORAL at 21:05

## 2018-08-04 RX ADMIN — Medication 50 MILLIGRAM(S): at 05:20

## 2018-08-04 RX ADMIN — HEPARIN SODIUM 5000 UNIT(S): 5000 INJECTION INTRAVENOUS; SUBCUTANEOUS at 17:58

## 2018-08-04 RX ADMIN — SERTRALINE 100 MILLIGRAM(S): 25 TABLET, FILM COATED ORAL at 12:56

## 2018-08-04 RX ADMIN — HEPARIN SODIUM 5000 UNIT(S): 5000 INJECTION INTRAVENOUS; SUBCUTANEOUS at 05:20

## 2018-08-04 RX ADMIN — PANTOPRAZOLE SODIUM 40 MILLIGRAM(S): 20 TABLET, DELAYED RELEASE ORAL at 08:56

## 2018-08-04 RX ADMIN — Medication 20 MILLIGRAM(S): at 05:20

## 2018-08-04 RX ADMIN — Medication 81 MILLIGRAM(S): at 12:55

## 2018-08-05 RX ORDER — METOPROLOL TARTRATE 50 MG
5 TABLET ORAL ONCE
Qty: 0 | Refills: 0 | Status: COMPLETED | OUTPATIENT
Start: 2018-08-05 | End: 2018-08-05

## 2018-08-05 RX ORDER — METOPROLOL TARTRATE 50 MG
5 TABLET ORAL ONCE
Qty: 0 | Refills: 0 | Status: DISCONTINUED | OUTPATIENT
Start: 2018-08-05 | End: 2018-08-05

## 2018-08-05 RX ORDER — METOPROLOL TARTRATE 50 MG
50 TABLET ORAL
Qty: 0 | Refills: 0 | Status: DISCONTINUED | OUTPATIENT
Start: 2018-08-05 | End: 2018-08-08

## 2018-08-05 RX ADMIN — Medication 20 MILLIGRAM(S): at 06:42

## 2018-08-05 RX ADMIN — Medication 50 MILLIGRAM(S): at 17:27

## 2018-08-05 RX ADMIN — Medication 81 MILLIGRAM(S): at 12:19

## 2018-08-05 RX ADMIN — SIMVASTATIN 20 MILLIGRAM(S): 20 TABLET, FILM COATED ORAL at 21:51

## 2018-08-05 RX ADMIN — Medication 5 MILLIGRAM(S): at 12:55

## 2018-08-05 RX ADMIN — SERTRALINE 100 MILLIGRAM(S): 25 TABLET, FILM COATED ORAL at 12:19

## 2018-08-05 RX ADMIN — Medication 50 MILLIGRAM(S): at 06:43

## 2018-08-05 RX ADMIN — HEPARIN SODIUM 5000 UNIT(S): 5000 INJECTION INTRAVENOUS; SUBCUTANEOUS at 17:27

## 2018-08-05 RX ADMIN — PANTOPRAZOLE SODIUM 40 MILLIGRAM(S): 20 TABLET, DELAYED RELEASE ORAL at 06:43

## 2018-08-05 RX ADMIN — HEPARIN SODIUM 5000 UNIT(S): 5000 INJECTION INTRAVENOUS; SUBCUTANEOUS at 06:42

## 2018-08-06 DIAGNOSIS — R53.81 OTHER MALAISE: ICD-10-CM

## 2018-08-06 RX ADMIN — Medication 50 MILLIGRAM(S): at 05:55

## 2018-08-06 RX ADMIN — HEPARIN SODIUM 5000 UNIT(S): 5000 INJECTION INTRAVENOUS; SUBCUTANEOUS at 18:00

## 2018-08-06 RX ADMIN — Medication 81 MILLIGRAM(S): at 11:16

## 2018-08-06 RX ADMIN — HEPARIN SODIUM 5000 UNIT(S): 5000 INJECTION INTRAVENOUS; SUBCUTANEOUS at 05:55

## 2018-08-06 RX ADMIN — SIMVASTATIN 20 MILLIGRAM(S): 20 TABLET, FILM COATED ORAL at 21:30

## 2018-08-06 RX ADMIN — PANTOPRAZOLE SODIUM 40 MILLIGRAM(S): 20 TABLET, DELAYED RELEASE ORAL at 10:21

## 2018-08-06 RX ADMIN — SERTRALINE 100 MILLIGRAM(S): 25 TABLET, FILM COATED ORAL at 11:16

## 2018-08-06 NOTE — DIETITIAN INITIAL EVALUATION ADULT. - SOURCE
Good appetite & po intake PTP. Regular diet. NKFA. No supplements. No known history of unintentional wt loss.

## 2018-08-06 NOTE — DIETITIAN INITIAL EVALUATION ADULT. - ENERGY NEEDS
Energy: 7474-9281 kcal/day (MSJx1.2-1.3 AF)    Protein: 72-87 g/day (1-1.2 g/kg ABW)    Fluids: 1 mL/kcal

## 2018-08-06 NOTE — DIETITIAN INITIAL EVALUATION ADULT. - OTHER INFO
Reason for RD assessment: LOS assessment. Pertinent Medical Information: p/w hallucinations. Dementia with behavioral disturbance noted. A.fib noted.

## 2018-08-06 NOTE — DIETITIAN INITIAL EVALUATION ADULT. - PROBLEM SELECTOR PROBLEM 2
CC: Patient presents with:  Diabetes: follow up. pt did bring pump.       HISTORY:  Past Medical History   Diagnosis Date   • Diabetes ([address]    • Diabetic neuropathy ([address]    • Hyperlipidemia       Family History   Problem Relation Age of Onset   • Multiple pump site on when exercising     Hypertension:  Blood Pressure: At goal    Medication: none      Hyperlipidemia:  LDL:  88 was 102      Medication:  Atorvastatin 20  SE denies     ROS:   Constitutional: Negative for fever, chills and + fatigue.  No distres (ONETOUCH ULTRA 2) w/Device Does not apply Kit, 1 Device by Other route 2 (two) times daily.  Use as directed., Disp: 1 kit, Rfl: 0  •  Glucose Blood (ONETOUCH ULTRA BLUE) In Vitro Strip, Test sugar 7 times daily uses insulin pump, Disp: 300 strip, Rfl: 11 row, Disp: 20 each, Rfl: 1    Exam:  /80 mmHg  Pulse 84  Temp(Src) 98.3 °F (36.8 °C) (Oral)  Resp 18  Ht 63\"  Wt 214 lb  BMI 37.92 kg/m2  LMP 05/28/2017 (Exact Date)  Breastfeeding? No  Constitutional: Oriented to person, place, and time.  No distres proteinuria   Monofilament exam: up to date   BP: at goal   Lipids: at goal  cont statin   Tobacco: not ready to quit   Flu vaccine: up to date   Pneumonia vaccine: up to date   Depression screen: up to date f/u with PCP     Check glucoses 4 times daily - Edema of both legs

## 2018-08-06 NOTE — DIETITIAN INITIAL EVALUATION ADULT. - PHYSICAL APPEARANCE
BMI: 24.3. Alert, disoriented. Abd soft, NT/ND per progress notes. Last BM 8/5. No chewing/swallowing difficulty reported at this time. Skin intact.

## 2018-08-06 NOTE — DIETITIAN INITIAL EVALUATION ADULT. - DIET TYPE
Per staff, pt is consuming 75% of meals at this time./DASH/TLC (sodium and cholesterol restricted diet)

## 2018-08-07 LAB
ANION GAP SERPL CALC-SCNC: 10 MMOL/L — SIGNIFICANT CHANGE UP (ref 7–14)
BUN SERPL-MCNC: 22 MG/DL — HIGH (ref 10–20)
CALCIUM SERPL-MCNC: 9.2 MG/DL — SIGNIFICANT CHANGE UP (ref 8.5–10.1)
CHLORIDE SERPL-SCNC: 107 MMOL/L — SIGNIFICANT CHANGE UP (ref 98–110)
CO2 SERPL-SCNC: 28 MMOL/L — SIGNIFICANT CHANGE UP (ref 17–32)
CREAT SERPL-MCNC: 1.1 MG/DL — SIGNIFICANT CHANGE UP (ref 0.7–1.5)
GLUCOSE SERPL-MCNC: 94 MG/DL — SIGNIFICANT CHANGE UP (ref 70–99)
HCT VFR BLD CALC: 43.3 % — SIGNIFICANT CHANGE UP (ref 37–47)
HGB BLD-MCNC: 14.5 G/DL — SIGNIFICANT CHANGE UP (ref 12–16)
MCHC RBC-ENTMCNC: 32 PG — HIGH (ref 27–31)
MCHC RBC-ENTMCNC: 33.5 G/DL — SIGNIFICANT CHANGE UP (ref 32–37)
MCV RBC AUTO: 95.6 FL — SIGNIFICANT CHANGE UP (ref 81–99)
NRBC # BLD: 0 /100 WBCS — SIGNIFICANT CHANGE UP (ref 0–0)
PLATELET # BLD AUTO: 187 K/UL — SIGNIFICANT CHANGE UP (ref 130–400)
POTASSIUM SERPL-MCNC: 3.9 MMOL/L — SIGNIFICANT CHANGE UP (ref 3.5–5)
POTASSIUM SERPL-SCNC: 3.9 MMOL/L — SIGNIFICANT CHANGE UP (ref 3.5–5)
RBC # BLD: 4.53 M/UL — SIGNIFICANT CHANGE UP (ref 4.2–5.4)
RBC # FLD: 13.1 % — SIGNIFICANT CHANGE UP (ref 11.5–14.5)
SODIUM SERPL-SCNC: 145 MMOL/L — SIGNIFICANT CHANGE UP (ref 135–146)
WBC # BLD: 6.16 K/UL — SIGNIFICANT CHANGE UP (ref 4.8–10.8)
WBC # FLD AUTO: 6.16 K/UL — SIGNIFICANT CHANGE UP (ref 4.8–10.8)

## 2018-08-07 RX ADMIN — SIMVASTATIN 20 MILLIGRAM(S): 20 TABLET, FILM COATED ORAL at 22:33

## 2018-08-07 RX ADMIN — HEPARIN SODIUM 5000 UNIT(S): 5000 INJECTION INTRAVENOUS; SUBCUTANEOUS at 18:34

## 2018-08-07 RX ADMIN — HEPARIN SODIUM 5000 UNIT(S): 5000 INJECTION INTRAVENOUS; SUBCUTANEOUS at 05:23

## 2018-08-07 RX ADMIN — Medication 50 MILLIGRAM(S): at 18:34

## 2018-08-07 RX ADMIN — PANTOPRAZOLE SODIUM 40 MILLIGRAM(S): 20 TABLET, DELAYED RELEASE ORAL at 08:33

## 2018-08-07 RX ADMIN — Medication 50 MILLIGRAM(S): at 05:23

## 2018-08-08 ENCOUNTER — TRANSCRIPTION ENCOUNTER (OUTPATIENT)
Age: 83
End: 2018-08-08

## 2018-08-08 VITALS
HEART RATE: 84 BPM | SYSTOLIC BLOOD PRESSURE: 136 MMHG | TEMPERATURE: 97 F | RESPIRATION RATE: 16 BRPM | DIASTOLIC BLOOD PRESSURE: 65 MMHG

## 2018-08-08 RX ORDER — ACETAMINOPHEN 500 MG
2 TABLET ORAL
Qty: 0 | Refills: 0 | COMMUNITY
Start: 2018-08-08

## 2018-08-08 RX ORDER — SERTRALINE 25 MG/1
1 TABLET, FILM COATED ORAL
Qty: 0 | Refills: 0 | COMMUNITY
Start: 2018-08-08

## 2018-08-08 RX ORDER — SIMVASTATIN 20 MG/1
1 TABLET, FILM COATED ORAL
Qty: 0 | Refills: 0 | COMMUNITY
Start: 2018-08-08

## 2018-08-08 RX ORDER — METOPROLOL TARTRATE 50 MG
1 TABLET ORAL
Qty: 0 | Refills: 0 | COMMUNITY
Start: 2018-08-08

## 2018-08-08 RX ORDER — ASPIRIN/CALCIUM CARB/MAGNESIUM 324 MG
1 TABLET ORAL
Qty: 0 | Refills: 0 | COMMUNITY
Start: 2018-08-08

## 2018-08-08 RX ADMIN — SERTRALINE 100 MILLIGRAM(S): 25 TABLET, FILM COATED ORAL at 11:50

## 2018-08-08 RX ADMIN — PANTOPRAZOLE SODIUM 40 MILLIGRAM(S): 20 TABLET, DELAYED RELEASE ORAL at 06:05

## 2018-08-08 RX ADMIN — HEPARIN SODIUM 5000 UNIT(S): 5000 INJECTION INTRAVENOUS; SUBCUTANEOUS at 06:05

## 2018-08-08 RX ADMIN — Medication 50 MILLIGRAM(S): at 06:05

## 2018-08-08 RX ADMIN — Medication 81 MILLIGRAM(S): at 11:50

## 2018-08-08 NOTE — DISCHARGE NOTE ADULT - PATIENT PORTAL LINK FT
You can access the [facility] Portal, offered by [facility], by registering with the following website: [email or web address]

## 2018-08-08 NOTE — PROGRESS NOTE ADULT - SUBJECTIVE AND OBJECTIVE BOX
Pt seen and examined. Pt feels well.    T(F): , Max: 96.9 (08-01-18 @ 22:22)  HR: 79 (08-02-18 @ 05:15) (79 - 110)  BP: 133/73 (08-02-18 @ 05:15)  RR: 16 (08-02-18 @ 05:15)  SpO2: --  General: No apparent distress, confused  Cardiovascular: S1, S2  Gastrointestinal: Soft, Non-tender, Non-distended  Respiratory: Good air entry bilaterally  Musculoskeletal: Moves all extremities  Lymphatic: No edema  Neurologic: No gross motor deficit  Dermatologic: Skin dry                          15.5   6.50  )-----------( 186      ( 01 Aug 2018 07:00 )             45.8     08-01    144  |  106  |  24<H>  ----------------------------<  92  3.5   |  27  |  1.2    Ca    9.4      01 Aug 2018 07:00
[facility]  87y  Female      Patient is a 87y old  Female who presents with a chief complaint of hallucinations (31 Jul 2018 06:35)      INTERVAL HPI/OVERNIGHT EVENTS:  patient seen and examined at bedside, no complaints, denies any palpitations    -Vital Signs Last 24 Hrs  T(C): 36.7 (06 Aug 2018 14:30), Max: 36.7 (06 Aug 2018 14:30)  T(F): 98.1 (06 Aug 2018 14:30), Max: 98.1 (06 Aug 2018 14:30)  HR: 73 (06 Aug 2018 14:30) (64 - 84)  BP: 92/58 (06 Aug 2018 14:30) (92/58 - 149/69)  RR: 16 (06 Aug 2018 14:30) (16 - 16)      PHYSICAL EXAM:  GENERAL: NAD, well-groomed, well-developed  HEAD:  Atraumatic, Normocephalic  EYES: EOMI, PERRLA, conjunctiva and sclera clear  NERVOUS SYSTEM:  Alert & Oriented X 3, average concentration; Motor Strength 5/5 B/L upper and lower extremities; DTRs 2+ intact and symmetric  CHEST/LUNG: Clear to percussion bilaterally; No rales, rhonchi, wheezing, or rubs  CV/HEART: Regular rate and rhythm; No murmurs, rubs, or gallops  GI/ABDOMEN: Soft, Nontender, Nondistended; Bowel sounds present  EXTREMITIES:  2+ Peripheral Pulses, No clubbing, cyanosis, or edema  SKIN: No rashes or lesions    LAB:  no new labs    RADIOLOGY:    Imaging Personally Reviewed:  [ Y ] YES  [ ] NO    HEALTH ISSUES - PROBLEM Dx:  Atrial fibrillation, unspecified type: Atrial fibrillation, unspecified type  Irregular heart beat: Irregular heart beat  Dementia with behavioral disturbance, unspecified dementia type: Dementia with behavioral disturbance, unspecified dementia type  Hypertension: Hypertension  Edema of both legs: Edema of both legs  Delirium: Delirium    MEDS:  acetaminophen   Tablet. 650 milliGRAM(s) Oral every 6 hours PRN  aspirin  chewable 81 milliGRAM(s) Oral daily  haloperidol     Tablet 0.5 milliGRAM(s) Oral two times a day PRN  heparin  Injectable 5000 Unit(s) SubCutaneous every 12 hours  metoprolol tartrate 50 milliGRAM(s) Oral two times a day  pantoprazole    Tablet 40 milliGRAM(s) Oral before breakfast  sertraline 100 milliGRAM(s) Oral daily  simvastatin 20 milliGRAM(s) Oral at bedtime
Pt seen and examined. Pt c/o chest discomfort "my insides are doing the Romanian jig"    T(F): , Max: 97.3 (08-04-18 @ 21:30)  HR: 89 (08-05-18 @ 06:18) (88 - 109)  BP: 121/64 (08-05-18 @ 06:18)  RR: 16 (08-05-18 @ 06:18)  SpO2: --  General: No apparent distress  Cardiovascular: S1, S2, irregular irregular and tachy  Gastrointestinal: Soft, Non-tender, Non-distended  Respiratory: Good air entry bilaterally  Musculoskeletal: Moves all extremities  Lymphatic: No edema  Neurologic: No gross motor deficit  Dermatologic: Skin dry
Pt seen and examined. Pt on guard and anxious    T(F): , Max: 96.8 (08-01-18 @ 04:56)  HR: 110 (08-01-18 @ 14:00) (110 - 135)  BP: 136/79 (08-01-18 @ 14:00)  RR: 16 (08-01-18 @ 14:00)  SpO2: --  IN: 0 mL / OUT: 2 mL / NET: -2 mL      General: No apparent distress  Cardiovascular: S1, S2  Gastrointestinal: Soft, Non-tender, Non-distended  Respiratory: Good air entry bilaterally  Musculoskeletal: Moves all extremities  Lymphatic: No edema  Neurologic: No gross motor deficit  Dermatologic: Skin dry  PT/INR - ( 31 Jul 2018 02:12 )   PT: 11.90 sec;   INR: 1.10 ratio         PTT - ( 31 Jul 2018 02:12 )  PTT:24.6 sec                        15.5   6.50  )-----------( 186      ( 01 Aug 2018 07:00 )             45.8     08-01    144  |  106  |  24<H>  ----------------------------<  92  3.5   |  27  |  1.2    Ca    9.4      01 Aug 2018 07:00    TPro  6.5  /  Alb  3.3<L>  /  TBili  0.5  /  DBili  <0.2  /  AST  21  /  ALT  9   /  AlkPhos  47  07-31
Pt seen and exmamined. Pt feels well    T(F): , Max: 97.9 (08-03-18 @ 22:00)  HR: 703 (08-04-18 @ 06:07) (81 - 703)  BP: 127/72 (08-04-18 @ 06:07)  RR: 16 (08-04-18 @ 06:07)  SpO2: 95% (08-03-18 @ 14:45)  General: No apparent distress, confused  Cardiovascular: S1, S2  Gastrointestinal: Soft, Non-tender, Non-distended  Respiratory: Good air entry bilaterally  Musculoskeletal: Moves all extremities  Lymphatic: No edema  Neurologic: No gross motor deficit  Dermatologic: Skin dry
SANDRA ALBERTA  87y  Female      Patient is a 87y old  Female who presents with a chief complaint of hallucinations (31 Jul 2018 06:35)      INTERVAL HPI/OVERNIGHT EVENTS:  patient comfortable in bed   dementia component but has her moments where she understands her hospital stay and NH placement   d/c planning to NH; /CM following    Vital Signs Last 24 Hrs  T(C): 36.3 (08 Aug 2018 13:47), Max: 36.7 (07 Aug 2018 13:59)  T(F): 97.3 (08 Aug 2018 13:47), Max: 98.1 (07 Aug 2018 13:59)  HR: 84 (08 Aug 2018 13:47) (84 - 113)  BP: 136/65 (08 Aug 2018 13:47) (108/55 - 140/69)  RR: 16 (08 Aug 2018 13:47) (16 - 16)      PHYSICAL EXAM:  GENERAL: NAD, mild dementia  HEAD:  Atraumatic, Normocephalic  EYES: EOMI, PERRLA, conjunctiva and sclera clear  NERVOUS SYSTEM:  Alert & Oriented X 2 today, average concentration  CHEST/LUNG: Clear to percussion bilaterally; No rales, rhonchi, wheezing, or rubs  CV/HEART: Regular rate and rhythm; No murmurs, rubs, or gallops  GI/ABDOMEN: Soft, Nontender, Nondistended; Bowel sounds present  EXTREMITIES:  2+ Peripheral Pulses, No clubbing, cyanosis, or edema  SKIN: No rashes or lesions    LAB:                        14.5   6.16  )-----------( 187      ( 07 Aug 2018 07:36 )             43.3   08-07    145  |  107  |  22<H>  ----------------------------<  94  3.9   |  28  |  1.1    Ca    9.2      07 Aug 2018 07:36    RADIOLOGY:    Imaging Personally Reviewed:  [ Y ] YES  [ ] NO    HEALTH ISSUES - PROBLEM Dx:  Atrial fibrillation, unspecified type: Atrial fibrillation, unspecified type  Irregular heart beat: Irregular heart beat  Dementia with behavioral disturbance, unspecified dementia type: Dementia with behavioral disturbance, unspecified dementia type  Hypertension: Hypertension  Edema of both legs: Edema of both legs  Delirium: Delirium    MEDICATIONS  (STANDING):  aspirin  chewable 81 milliGRAM(s) Oral daily  heparin  Injectable 5000 Unit(s) SubCutaneous every 12 hours  metoprolol tartrate 50 milliGRAM(s) Oral two times a day  pantoprazole    Tablet 40 milliGRAM(s) Oral before breakfast  sertraline 100 milliGRAM(s) Oral daily  simvastatin 20 milliGRAM(s) Oral at bedtime    MEDICATIONS  (PRN):  acetaminophen   Tablet. 650 milliGRAM(s) Oral every 6 hours PRN Moderate Pain (4 - 6)  haloperidol     Tablet 0.5 milliGRAM(s) Oral two times a day PRN agitation
[facility]  87[facility]      Patient is a 87y old  Female who presents with a chief complaint of hallucinations (31 Jul 2018 06:35)      INTERVAL HPI/OVERNIGHT EVENTS:  patient comfortable in bed  d/c planning to NH;  on  board    Vital Signs Last 24 Hrs  T(C): 36.7 (07 Aug 2018 13:59), Max: 37.1 (06 Aug 2018 22:12)  T(F): 98.1 (07 Aug 2018 13:59), Max: 98.7 (06 Aug 2018 22:12)  HR: 87 (07 Aug 2018 13:59) (80 - 97)  BP: 121/62 (07 Aug 2018 13:59) (121/62 - 133/72)  RR: 16 (07 Aug 2018 05:48) (16 - 16)      PHYSICAL EXAM:  GENERAL: NAD, mild dementia  HEAD:  Atraumatic, Normocephalic  EYES: EOMI, PERRLA, conjunctiva and sclera clear  NERVOUS SYSTEM:  Alert & Oriented X 2 today, average concentration  CHEST/LUNG: Clear to percussion bilaterally; No rales, rhonchi, wheezing, or rubs  CV/HEART: Regular rate and rhythm; No murmurs, rubs, or gallops  GI/ABDOMEN: Soft, Nontender, Nondistended; Bowel sounds present  EXTREMITIES:  2+ Peripheral Pulses, No clubbing, cyanosis, or edema  SKIN: No rashes or lesions    LAB:                        14.5   6.16  )-----------( 187      ( 07 Aug 2018 07:36 )             43.3   08-07    145  |  107  |  22<H>  ----------------------------<  94  3.9   |  28  |  1.1    Ca    9.2      07 Aug 2018 07:36        RADIOLOGY:    Imaging Personally Reviewed:  [ Y ] YES  [ ] NO    HEALTH ISSUES - PROBLEM Dx:  Atrial fibrillation, unspecified type: Atrial fibrillation, unspecified type  Irregular heart beat: Irregular heart beat  Dementia with behavioral disturbance, unspecified dementia type: Dementia with behavioral disturbance, unspecified dementia type  Hypertension: Hypertension  Edema of both legs: Edema of both legs  Delirium: Delirium    MEDICATIONS  (STANDING):  aspirin  chewable 81 milliGRAM(s) Oral daily  heparin  Injectable 5000 Unit(s) SubCutaneous every 12 hours  metoprolol tartrate 50 milliGRAM(s) Oral two times a day  pantoprazole    Tablet 40 milliGRAM(s) Oral before breakfast  sertraline 100 milliGRAM(s) Oral daily  simvastatin 20 milliGRAM(s) Oral at bedtime    MEDICATIONS  (PRN):  acetaminophen   Tablet. 650 milliGRAM(s) Oral every 6 hours PRN Moderate Pain (4 - 6)  haloperidol     Tablet 0.5 milliGRAM(s) Oral two times a day PRN agitation
Pt seen and examined.  at bedside    T(F): , Max: 97.7 (08-02-18 @ 14:10)  HR: 100 (08-03-18 @ 05:48) (60 - 102)  BP: 128/78 (08-03-18 @ 05:48)  RR: 16 (08-03-18 @ 05:48)  SpO2: --  General: No apparent distress, confused  Cardiovascular: S1, S2  Gastrointestinal: Soft, Non-tender, Non-distended  Respiratory: Good air entry bilaterally  Musculoskeletal: Moves all extremities  Lymphatic: No edema  Neurologic: No gross motor deficit  Dermatologic: Skin dry

## 2018-08-08 NOTE — PROGRESS NOTE ADULT - PROBLEM SELECTOR PLAN 4
EKG
-rehab/pt  -NH placement; can be discharged from medical stand point
-rehab/pt  -Spoke to  Germaine about d/c planning; likely NH placement
CHADSVASC - 4 may benefit from anticoagulation  HASBLED - 3 HIGH risk for bleed  ATRIA - 8 HIGH risk of hemorrhagic CVA  Pt will likely NOT benefit from anticoagulation. Rate control method    -d/c tele today  -oob to chair as tolerated
Echo pending  CHADSVASC - 4 may benefit from anticoagulation  HASBLED - 3 HIGH risk for bleed  ATRIA - 8 HIGH risk of hemorrhagic CVA  Pt will likely NOT benefit from anticoagulation. Rate control method  HR max 140s today, BB IV given. Switched to Lopressor [id number]
HR controlled. New onset presumed since pt poor historian.  echo  CHADSVASC - 4 may benefit from anticoagulation  HASBLED - 3 HIGH risk for bleed  ATRIA - 8 HIGH risk of hemorrhagic CVA  Pt will likely NOT benefit from anticoagulation. Rate control method

## 2018-08-08 NOTE — DISCHARGE NOTE ADULT - CARE PLAN
Principal Discharge DX:	Dementia  Goal:	to be symptom free  Assessment and plan of treatment:	-follow up with PMD in nursing home

## 2018-08-08 NOTE — PROGRESS NOTE ADULT - PROBLEM SELECTOR PLAN 3
Mood improved today
controlled
-not agitated today
CHADSVASC - 4 may benefit from anticoagulation  HASBLED - 3 HIGH risk for bleed  ATRIA - 8 HIGH risk of hemorrhagic CVA  Pt will likely NOT benefit from anticoagulation. Rate control method    -oob to chair as tolerated
Mood cheerful again
Mood cheerful today
controlled
high FILIBERTO score, but increased risk to fall and underlying dementia; hence rate controlled method pursued in patient's best interest  -continue with Aspirin

## 2018-08-08 NOTE — DISCHARGE NOTE ADULT - HOSPITAL COURSE
***patient seen and examined at bedside. Spent over 40mins d/c planning, d/c papers and med rec ***    Vital Signs Last 24 Hrs  T(C): 36.3 (08 Aug 2018 13:47), Max: 36.3 (08 Aug 2018 13:47)  T(F): 97.3 (08 Aug 2018 13:47), Max: 97.3 (08 Aug 2018 13:47)  HR: 84 (08 Aug 2018 13:47) (84 - 113)  BP: 136/65 (08 Aug 2018 13:47) (108/55 - 140/69)  RR: 16 (08 Aug 2018 13:47) (16 - 16)    Assessment and Plan:    Problem/Plan - 1:  ·  Problem: Hypertension.  Plan: -stable.      Problem/Plan - 2:  ·  Problem: Dementia with behavioral disturbance, unspecified dementia type.  Plan: -not agitated today.      Problem/Plan - 3:  ·  Problem: Atrial fibrillation, unspecified type.  Plan: high FILIBERTO score, but increased risk to fall and underlying dementia; hence rate controlled method pursued in patient's best interest  -continue with Aspirin.      Problem/Plan - 4:  ·  Problem: Debility.  Plan: -rehab/pt  -NH placement; can be discharged from medical stand point.     Problem - 5:   suspected CKD stage III    patient can be discharged to nursing facility today if bed available.

## 2018-08-08 NOTE — PROGRESS NOTE ADULT - PROBLEM SELECTOR PROBLEM 2
Edema of both legs
Dementia with behavioral disturbance, unspecified dementia type
Dementia with behavioral disturbance, unspecified dementia type
Edema of both legs
Hypertension

## 2018-08-08 NOTE — PROGRESS NOTE ADULT - PROBLEM SELECTOR PROBLEM 3
Dementia with behavioral disturbance, unspecified dementia type
Hypertension
Atrial fibrillation, unspecified type
Atrial fibrillation, unspecified type
Dementia with behavioral disturbance, unspecified dementia type
Hypertension

## 2018-08-08 NOTE — PROGRESS NOTE ADULT - PROBLEM SELECTOR PROBLEM 4
Irregular heart beat
Atrial fibrillation, unspecified type
Debility
Debility

## 2018-08-08 NOTE — PROGRESS NOTE ADULT - PROBLEM SELECTOR PROBLEM 1
Delirium
Delirium
Edema of both legs
Hypertension
Hypertension
Edema of both legs

## 2018-08-08 NOTE — PROGRESS NOTE ADULT - PROBLEM SELECTOR PLAN 2
resolving
-not agitated today
-not agitated today
-stable
controlled for age
controlled for age
diuretics
controlled

## 2018-08-08 NOTE — PROGRESS NOTE ADULT - PROBLEM SELECTOR PLAN 1
Dementia related
-resolved
-stable
-stable
Psych seen,  likely severe demntia
mild
mild, resolved
stable

## 2018-08-08 NOTE — DISCHARGE NOTE ADULT - MEDICATION SUMMARY - MEDICATIONS TO STOP TAKING
I will STOP taking the medications listed below when I get home from the hospital:    Vitamin Daily oral liquid

## 2018-08-08 NOTE — DISCHARGE NOTE ADULT - MEDICATION SUMMARY - MEDICATIONS TO TAKE
I will START or STAY ON the medications listed below when I get home from the hospital:    acetaminophen 325 mg oral tablet  -- 2 tab(s) by mouth every 6 hours, As needed, Moderate Pain (4 - 6)  -- Indication: For pain control    aspirin 81 mg oral tablet, chewable  -- 1 tab(s) by mouth once a day  -- Indication: For CAD    sertraline 100 mg oral tablet  -- 1 tab(s) by mouth once a day  -- Indication: For Depression    simvastatin 20 mg oral tablet  -- 1 tab(s) by mouth once a day (at bedtime)  -- Indication: For CAD    metoprolol tartrate 50 mg oral tablet  -- 1 tab(s) by mouth 2 times a day. Hold for SBP < 105 and HR < 60  -- Indication: For Hypertension

## 2018-08-09 ENCOUNTER — OUTPATIENT (OUTPATIENT)
Dept: OUTPATIENT SERVICES | Facility: HOSPITAL | Age: 83
LOS: 1 days | Discharge: HOME | End: 2018-08-09

## 2018-08-09 DIAGNOSIS — Z79.899 OTHER LONG TERM (CURRENT) DRUG THERAPY: ICD-10-CM

## 2018-08-10 PROBLEM — E78.00 PURE HYPERCHOLESTEROLEMIA, UNSPECIFIED: Chronic | Status: ACTIVE | Noted: 2018-07-31

## 2018-08-10 PROBLEM — I10 ESSENTIAL (PRIMARY) HYPERTENSION: Chronic | Status: ACTIVE | Noted: 2018-07-31

## 2018-08-11 DIAGNOSIS — Z79.01 LONG TERM (CURRENT) USE OF ANTICOAGULANTS: ICD-10-CM

## 2018-08-11 DIAGNOSIS — B35.1 TINEA UNGUIUM: ICD-10-CM

## 2018-08-11 DIAGNOSIS — R44.1 VISUAL HALLUCINATIONS: ICD-10-CM

## 2018-08-11 DIAGNOSIS — I48.91 UNSPECIFIED ATRIAL FIBRILLATION: ICD-10-CM

## 2018-08-11 DIAGNOSIS — I25.10 ATHEROSCLEROTIC HEART DISEASE OF NATIVE CORONARY ARTERY WITHOUT ANGINA PECTORIS: ICD-10-CM

## 2018-08-11 DIAGNOSIS — R60.0 LOCALIZED EDEMA: ICD-10-CM

## 2018-08-11 DIAGNOSIS — F03.91 UNSPECIFIED DEMENTIA WITH BEHAVIORAL DISTURBANCE: ICD-10-CM

## 2018-08-11 DIAGNOSIS — F32.9 MAJOR DEPRESSIVE DISORDER, SINGLE EPISODE, UNSPECIFIED: ICD-10-CM

## 2018-08-11 DIAGNOSIS — N18.3 CHRONIC KIDNEY DISEASE, STAGE 3 (MODERATE): ICD-10-CM

## 2018-08-11 DIAGNOSIS — I12.9 HYPERTENSIVE CHRONIC KIDNEY DISEASE WITH STAGE 1 THROUGH STAGE 4 CHRONIC KIDNEY DISEASE, OR UNSPECIFIED CHRONIC KIDNEY DISEASE: ICD-10-CM

## 2018-09-07 ENCOUNTER — APPOINTMENT (OUTPATIENT)
Dept: GERIATRICS | Facility: HOME HEALTH | Age: 83
End: 2018-09-07

## 2018-09-07 VITALS — DIASTOLIC BLOOD PRESSURE: 60 MMHG | HEART RATE: 80 BPM | SYSTOLIC BLOOD PRESSURE: 100 MMHG | RESPIRATION RATE: 16 BRPM

## 2018-09-07 DIAGNOSIS — Z87.891 PERSONAL HISTORY OF NICOTINE DEPENDENCE: ICD-10-CM

## 2018-09-07 RX ORDER — SIMVASTATIN 20 MG/1
20 TABLET, FILM COATED ORAL
Qty: 30 | Refills: 0 | Status: DISCONTINUED | COMMUNITY
Start: 2017-10-05

## 2018-09-07 RX ORDER — FUROSEMIDE 40 MG/1
40 TABLET ORAL
Qty: 30 | Refills: 0 | Status: DISCONTINUED | COMMUNITY
Start: 2018-05-23

## 2018-12-09 ENCOUNTER — APPOINTMENT (OUTPATIENT)
Dept: GERIATRICS | Facility: HOME HEALTH | Age: 83
End: 2018-12-09

## 2018-12-09 VITALS
HEART RATE: 80 BPM | OXYGEN SATURATION: 97 % | RESPIRATION RATE: 16 BRPM | SYSTOLIC BLOOD PRESSURE: 110 MMHG | DIASTOLIC BLOOD PRESSURE: 70 MMHG

## 2018-12-09 DIAGNOSIS — M19.90 UNSPECIFIED OSTEOARTHRITIS, UNSPECIFIED SITE: ICD-10-CM

## 2018-12-09 DIAGNOSIS — E78.5 HYPERLIPIDEMIA, UNSPECIFIED: ICD-10-CM

## 2018-12-09 DIAGNOSIS — R26.81 UNSTEADINESS ON FEET: ICD-10-CM

## 2018-12-09 DIAGNOSIS — I48.91 UNSPECIFIED ATRIAL FIBRILLATION: ICD-10-CM

## 2018-12-09 DIAGNOSIS — Z00.00 ENCOUNTER FOR GENERAL ADULT MEDICAL EXAMINATION W/OUT ABNORMAL FINDINGS: ICD-10-CM

## 2018-12-09 DIAGNOSIS — I10 ESSENTIAL (PRIMARY) HYPERTENSION: ICD-10-CM

## 2018-12-09 DIAGNOSIS — F32.9 MAJOR DEPRESSIVE DISORDER, SINGLE EPISODE, UNSPECIFIED: ICD-10-CM

## 2018-12-09 RX ORDER — ATORVASTATIN CALCIUM 10 MG/1
10 TABLET, FILM COATED ORAL
Qty: 30 | Refills: 0 | Status: DISCONTINUED | COMMUNITY
Start: 2018-09-03 | End: 2018-12-09

## 2018-12-10 ENCOUNTER — LABORATORY RESULT (OUTPATIENT)
Age: 83
End: 2018-12-10

## 2018-12-10 ENCOUNTER — OUTPATIENT (OUTPATIENT)
Dept: OUTPATIENT SERVICES | Facility: HOSPITAL | Age: 83
LOS: 1 days | Discharge: HOME | End: 2018-12-10

## 2018-12-10 DIAGNOSIS — E78.00 PURE HYPERCHOLESTEROLEMIA, UNSPECIFIED: ICD-10-CM

## 2018-12-10 DIAGNOSIS — E03.9 HYPOTHYROIDISM, UNSPECIFIED: ICD-10-CM

## 2018-12-10 DIAGNOSIS — D64.9 ANEMIA, UNSPECIFIED: ICD-10-CM

## 2018-12-10 DIAGNOSIS — E55.9 VITAMIN D DEFICIENCY, UNSPECIFIED: ICD-10-CM

## 2018-12-10 DIAGNOSIS — E11.9 TYPE 2 DIABETES MELLITUS WITHOUT COMPLICATIONS: ICD-10-CM

## 2018-12-31 ENCOUNTER — RX RENEWAL (OUTPATIENT)
Age: 83
End: 2018-12-31

## 2018-12-31 RX ORDER — SIMVASTATIN 20 MG/1
20 TABLET, FILM COATED ORAL
Qty: 90 | Refills: 3 | Status: ACTIVE | COMMUNITY
Start: 2018-12-09 | End: 1900-01-01

## 2018-12-31 RX ORDER — SERTRALINE HYDROCHLORIDE 100 MG/1
100 TABLET, FILM COATED ORAL
Qty: 90 | Refills: 3 | Status: ACTIVE | COMMUNITY
Start: 2017-10-05 | End: 1900-01-01

## 2018-12-31 RX ORDER — SERTRALINE 25 MG/1
25 TABLET, FILM COATED ORAL
Qty: 90 | Refills: 3 | Status: ACTIVE | COMMUNITY
Start: 2018-09-03 | End: 1900-01-01

## 2018-12-31 RX ORDER — METOPROLOL TARTRATE 50 MG/1
50 TABLET, FILM COATED ORAL
Qty: 180 | Refills: 3 | Status: ACTIVE | COMMUNITY
Start: 2017-10-05 | End: 1900-01-01

## 2018-12-31 RX ORDER — ASPIRIN ENTERIC COATED TABLETS 81 MG 81 MG/1
81 TABLET, DELAYED RELEASE ORAL
Qty: 90 | Refills: 3 | Status: ACTIVE | COMMUNITY
Start: 2018-05-23 | End: 1900-01-01

## 2018-12-31 RX ORDER — FUROSEMIDE 40 MG/1
40 TABLET ORAL DAILY
Qty: 90 | Refills: 3 | Status: ACTIVE | COMMUNITY
Start: 2018-12-09 | End: 1900-01-01

## 2019-01-07 ENCOUNTER — INPATIENT (INPATIENT)
Facility: HOSPITAL | Age: 84
LOS: 3 days | Discharge: NURSING, REHAB OR LONG-TERM CARE | End: 2019-01-11
Attending: INTERNAL MEDICINE | Admitting: INTERNAL MEDICINE

## 2019-01-07 VITALS
OXYGEN SATURATION: 96 % | DIASTOLIC BLOOD PRESSURE: 81 MMHG | RESPIRATION RATE: 18 BRPM | HEART RATE: 114 BPM | SYSTOLIC BLOOD PRESSURE: 103 MMHG | TEMPERATURE: 97 F

## 2019-01-07 LAB
ALBUMIN SERPL ELPH-MCNC: 3.6 G/DL — SIGNIFICANT CHANGE UP (ref 3.5–5.2)
ALP SERPL-CCNC: 56 U/L — SIGNIFICANT CHANGE UP (ref 30–115)
ALT FLD-CCNC: 8 U/L — SIGNIFICANT CHANGE UP (ref 0–41)
ANION GAP SERPL CALC-SCNC: 16 MMOL/L — HIGH (ref 7–14)
APPEARANCE UR: ABNORMAL
APTT BLD: 31.7 SEC — SIGNIFICANT CHANGE UP (ref 27–39.2)
AST SERPL-CCNC: 14 U/L — SIGNIFICANT CHANGE UP (ref 0–41)
BASE EXCESS BLDV CALC-SCNC: 3.1 MMOL/L — HIGH (ref -2–2)
BASOPHILS # BLD AUTO: 0.06 K/UL — SIGNIFICANT CHANGE UP (ref 0–0.2)
BASOPHILS NFR BLD AUTO: 0.8 % — SIGNIFICANT CHANGE UP (ref 0–1)
BILIRUB SERPL-MCNC: 0.6 MG/DL — SIGNIFICANT CHANGE UP (ref 0.2–1.2)
BILIRUB UR-MCNC: NEGATIVE — SIGNIFICANT CHANGE UP
BUN SERPL-MCNC: 33 MG/DL — HIGH (ref 10–20)
CA-I SERPL-SCNC: 1.25 MMOL/L — SIGNIFICANT CHANGE UP (ref 1.12–1.3)
CALCIUM SERPL-MCNC: 10.1 MG/DL — SIGNIFICANT CHANGE UP (ref 8.5–10.1)
CHLORIDE SERPL-SCNC: 100 MMOL/L — SIGNIFICANT CHANGE UP (ref 98–110)
CO2 SERPL-SCNC: 27 MMOL/L — SIGNIFICANT CHANGE UP (ref 17–32)
COLOR SPEC: YELLOW — SIGNIFICANT CHANGE UP
CREAT SERPL-MCNC: 1.5 MG/DL — SIGNIFICANT CHANGE UP (ref 0.7–1.5)
DIFF PNL FLD: ABNORMAL
EOSINOPHIL # BLD AUTO: 0.05 K/UL — SIGNIFICANT CHANGE UP (ref 0–0.7)
EOSINOPHIL NFR BLD AUTO: 0.6 % — SIGNIFICANT CHANGE UP (ref 0–8)
GAS PNL BLDV: 144 MMOL/L — SIGNIFICANT CHANGE UP (ref 136–145)
GAS PNL BLDV: SIGNIFICANT CHANGE UP
GLUCOSE SERPL-MCNC: 110 MG/DL — HIGH (ref 70–99)
GLUCOSE UR QL: NEGATIVE MG/DL — SIGNIFICANT CHANGE UP
HCO3 BLDV-SCNC: 29 MMOL/L — SIGNIFICANT CHANGE UP (ref 22–29)
HCT VFR BLD CALC: 46.9 % — SIGNIFICANT CHANGE UP (ref 37–47)
HCT VFR BLDA CALC: 48.5 % — HIGH (ref 34–44)
HGB BLD CALC-MCNC: 15.8 G/DL — SIGNIFICANT CHANGE UP (ref 14–18)
HGB BLD-MCNC: 15.2 G/DL — SIGNIFICANT CHANGE UP (ref 12–16)
IMM GRANULOCYTES NFR BLD AUTO: 0.5 % — HIGH (ref 0.1–0.3)
INR BLD: 1 RATIO — SIGNIFICANT CHANGE UP (ref 0.65–1.3)
KETONES UR-MCNC: NEGATIVE — SIGNIFICANT CHANGE UP
LACTATE BLDV-MCNC: 1.6 MMOL/L — SIGNIFICANT CHANGE UP (ref 0.5–1.6)
LACTATE SERPL-SCNC: 1.5 MMOL/L — SIGNIFICANT CHANGE UP (ref 0.5–2.2)
LEUKOCYTE ESTERASE UR-ACNC: ABNORMAL
LYMPHOCYTES # BLD AUTO: 0.95 K/UL — LOW (ref 1.2–3.4)
LYMPHOCYTES # BLD AUTO: 12.1 % — LOW (ref 20.5–51.1)
MAGNESIUM SERPL-MCNC: 2.1 MG/DL — SIGNIFICANT CHANGE UP (ref 1.8–2.4)
MCHC RBC-ENTMCNC: 31 PG — SIGNIFICANT CHANGE UP (ref 27–31)
MCHC RBC-ENTMCNC: 32.4 G/DL — SIGNIFICANT CHANGE UP (ref 32–37)
MCV RBC AUTO: 95.5 FL — SIGNIFICANT CHANGE UP (ref 81–99)
MONOCYTES # BLD AUTO: 0.64 K/UL — HIGH (ref 0.1–0.6)
MONOCYTES NFR BLD AUTO: 8.2 % — SIGNIFICANT CHANGE UP (ref 1.7–9.3)
NEUTROPHILS # BLD AUTO: 6.11 K/UL — SIGNIFICANT CHANGE UP (ref 1.4–6.5)
NEUTROPHILS NFR BLD AUTO: 77.8 % — HIGH (ref 42.2–75.2)
NITRITE UR-MCNC: POSITIVE
NRBC # BLD: 0 /100 WBCS — SIGNIFICANT CHANGE UP (ref 0–0)
PCO2 BLDV: 49 MMHG — SIGNIFICANT CHANGE UP (ref 41–51)
PH BLDV: 7.39 — SIGNIFICANT CHANGE UP (ref 7.26–7.43)
PH UR: 7 — SIGNIFICANT CHANGE UP (ref 5–8)
PLATELET # BLD AUTO: 176 K/UL — SIGNIFICANT CHANGE UP (ref 130–400)
PO2 BLDV: 28 MMHG — SIGNIFICANT CHANGE UP (ref 20–40)
POTASSIUM BLDV-SCNC: 3.7 MMOL/L — SIGNIFICANT CHANGE UP (ref 3.3–5.6)
POTASSIUM SERPL-MCNC: 4.1 MMOL/L — SIGNIFICANT CHANGE UP (ref 3.5–5)
POTASSIUM SERPL-SCNC: 4.1 MMOL/L — SIGNIFICANT CHANGE UP (ref 3.5–5)
PROT SERPL-MCNC: 6.9 G/DL — SIGNIFICANT CHANGE UP (ref 6–8)
PROT UR-MCNC: 100 MG/DL
PROTHROM AB SERPL-ACNC: 11.5 SEC — SIGNIFICANT CHANGE UP (ref 9.95–12.87)
RBC # BLD: 4.91 M/UL — SIGNIFICANT CHANGE UP (ref 4.2–5.4)
RBC # FLD: 13.9 % — SIGNIFICANT CHANGE UP (ref 11.5–14.5)
SAO2 % BLDV: 52 % — SIGNIFICANT CHANGE UP
SODIUM SERPL-SCNC: 143 MMOL/L — SIGNIFICANT CHANGE UP (ref 135–146)
SP GR SPEC: 1.01 — SIGNIFICANT CHANGE UP (ref 1.01–1.03)
TROPONIN T SERPL-MCNC: <0.01 NG/ML — SIGNIFICANT CHANGE UP
UROBILINOGEN FLD QL: 0.2 MG/DL — SIGNIFICANT CHANGE UP (ref 0.2–0.2)
WBC # BLD: 7.85 K/UL — SIGNIFICANT CHANGE UP (ref 4.8–10.8)
WBC # FLD AUTO: 7.85 K/UL — SIGNIFICANT CHANGE UP (ref 4.8–10.8)

## 2019-01-07 RX ORDER — SODIUM CHLORIDE 9 MG/ML
1000 INJECTION, SOLUTION INTRAVENOUS ONCE
Qty: 0 | Refills: 0 | Status: COMPLETED | OUTPATIENT
Start: 2019-01-07 | End: 2019-01-07

## 2019-01-07 RX ADMIN — SODIUM CHLORIDE 1000 MILLILITER(S): 9 INJECTION, SOLUTION INTRAVENOUS at 21:05

## 2019-01-07 RX ADMIN — SODIUM CHLORIDE 1000 MILLILITER(S): 9 INJECTION, SOLUTION INTRAVENOUS at 22:59

## 2019-01-07 NOTE — ED ADULT NURSE NOTE - NSIMPLEMENTINTERV_GEN_ALL_ED
Implemented All Fall with Harm Risk Interventions:  [place] to call system. Call bell, personal items and telephone within reach. Instruct patient to call for assistance. Room bathroom lighting operational. Non-slip footwear when patient is off stretcher. Physically safe environment: no spills, clutter or unnecessary equipment. Stretcher in lowest position, wheels locked, appropriate side rails in place. Provide visual cue, wrist band, yellow gown, etc. Monitor gait and stability. Monitor for mental status changes and reorient to person, place, and time. Review medications for side effects contributing to fall risk. Reinforce activity limits and safety measures with patient and family. Provide visual clues: red socks.
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
## 2019-01-08 PROBLEM — R60.0 LOCALIZED EDEMA: Chronic | Status: INACTIVE | Noted: 2018-06-04 | Resolved: 2019-01-08

## 2019-01-08 PROBLEM — M25.471 EFFUSION, RIGHT ANKLE: Chronic | Status: INACTIVE | Noted: 2018-06-04 | Resolved: 2019-01-08

## 2019-01-08 LAB
BACTERIA # UR AUTO: ABNORMAL /HPF
EPI CELLS # UR: ABNORMAL /HPF
RBC CASTS # UR COMP ASSIST: ABNORMAL /HPF
WBC UR QL: ABNORMAL /HPF

## 2019-01-08 RX ORDER — CHLORHEXIDINE GLUCONATE 213 G/1000ML
1 SOLUTION TOPICAL
Qty: 0 | Refills: 0 | Status: DISCONTINUED | OUTPATIENT
Start: 2019-01-08 | End: 2019-01-11

## 2019-01-08 RX ORDER — SIMVASTATIN 20 MG/1
20 TABLET, FILM COATED ORAL AT BEDTIME
Qty: 0 | Refills: 0 | Status: DISCONTINUED | OUTPATIENT
Start: 2019-01-08 | End: 2019-01-11

## 2019-01-08 RX ORDER — SERTRALINE 25 MG/1
100 TABLET, FILM COATED ORAL DAILY
Qty: 0 | Refills: 0 | Status: DISCONTINUED | OUTPATIENT
Start: 2019-01-08 | End: 2019-01-11

## 2019-01-08 RX ORDER — ASPIRIN/CALCIUM CARB/MAGNESIUM 324 MG
81 TABLET ORAL DAILY
Qty: 0 | Refills: 0 | Status: DISCONTINUED | OUTPATIENT
Start: 2019-01-08 | End: 2019-01-11

## 2019-01-08 RX ORDER — METOPROLOL TARTRATE 50 MG
50 TABLET ORAL
Qty: 0 | Refills: 0 | Status: DISCONTINUED | OUTPATIENT
Start: 2019-01-08 | End: 2019-01-11

## 2019-01-08 RX ORDER — HEPARIN SODIUM 5000 [USP'U]/ML
5000 INJECTION INTRAVENOUS; SUBCUTANEOUS EVERY 8 HOURS
Qty: 0 | Refills: 0 | Status: DISCONTINUED | OUTPATIENT
Start: 2019-01-08 | End: 2019-01-11

## 2019-01-08 RX ORDER — ACETAMINOPHEN 500 MG
650 TABLET ORAL EVERY 6 HOURS
Qty: 0 | Refills: 0 | Status: DISCONTINUED | OUTPATIENT
Start: 2019-01-08 | End: 2019-01-11

## 2019-01-08 RX ORDER — SODIUM CHLORIDE 9 MG/ML
1000 INJECTION, SOLUTION INTRAVENOUS ONCE
Qty: 0 | Refills: 0 | Status: COMPLETED | OUTPATIENT
Start: 2019-01-08 | End: 2019-01-08

## 2019-01-08 RX ORDER — CEFTRIAXONE 500 MG/1
1 INJECTION, POWDER, FOR SOLUTION INTRAMUSCULAR; INTRAVENOUS ONCE
Qty: 0 | Refills: 0 | Status: COMPLETED | OUTPATIENT
Start: 2019-01-08 | End: 2019-01-08

## 2019-01-08 RX ORDER — CEFTRIAXONE 500 MG/1
1 INJECTION, POWDER, FOR SOLUTION INTRAMUSCULAR; INTRAVENOUS EVERY 24 HOURS
Qty: 0 | Refills: 0 | Status: DISCONTINUED | OUTPATIENT
Start: 2019-01-08 | End: 2019-01-10

## 2019-01-08 RX ADMIN — Medication 650 MILLIGRAM(S): at 06:06

## 2019-01-08 RX ADMIN — SODIUM CHLORIDE 1000 MILLILITER(S): 9 INJECTION, SOLUTION INTRAVENOUS at 00:51

## 2019-01-08 RX ADMIN — Medication 81 MILLIGRAM(S): at 12:08

## 2019-01-08 RX ADMIN — Medication 50 MILLIGRAM(S): at 06:06

## 2019-01-08 RX ADMIN — SERTRALINE 100 MILLIGRAM(S): 25 TABLET, FILM COATED ORAL at 12:08

## 2019-01-08 RX ADMIN — HEPARIN SODIUM 5000 UNIT(S): 5000 INJECTION INTRAVENOUS; SUBCUTANEOUS at 06:06

## 2019-01-08 RX ADMIN — SIMVASTATIN 20 MILLIGRAM(S): 20 TABLET, FILM COATED ORAL at 21:20

## 2019-01-08 RX ADMIN — Medication 650 MILLIGRAM(S): at 12:08

## 2019-01-08 RX ADMIN — Medication 50 MILLIGRAM(S): at 17:48

## 2019-01-08 RX ADMIN — CEFTRIAXONE 100 GRAM(S): 500 INJECTION, POWDER, FOR SOLUTION INTRAMUSCULAR; INTRAVENOUS at 12:10

## 2019-01-08 RX ADMIN — Medication 650 MILLIGRAM(S): at 17:50

## 2019-01-08 RX ADMIN — HEPARIN SODIUM 5000 UNIT(S): 5000 INJECTION INTRAVENOUS; SUBCUTANEOUS at 13:44

## 2019-01-08 RX ADMIN — CEFTRIAXONE 100 GRAM(S): 500 INJECTION, POWDER, FOR SOLUTION INTRAMUSCULAR; INTRAVENOUS at 00:51

## 2019-01-08 RX ADMIN — HEPARIN SODIUM 5000 UNIT(S): 5000 INJECTION INTRAVENOUS; SUBCUTANEOUS at 21:20
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
## 2019-01-08 NOTE — CONSULT NOTE ADULT - ASSESSMENT
IMPRESSION: Rehab of gait ab altered mental status/ dementia    PRECAUTIONS: [  x  ] Cardiac  [    ] Respiratory  [    ] Seizures [    ] Contact Isolation  [    ] Droplet Isolation  [    ] Other    Weight Bearing Status:     RECOMMENDATION: PSYCH EVAL AT SNF secondary to HALLUCINATIONS    Out of Bed to Chair     DVT/Decubiti Prophylaxis    REHAB PLAN:     [  x   ] Bedside P/T 3-5 times a week   [     ] Bedside O/T  2-3 times a week   [     ] No Rehab Therapy Indicated   [     ]  Speech Therapy   Conditioning/ROM                                 ADL  Bed Mobility                                            Conditioning/ROM  Transfers                                                  Bed Mobility  Sitting /Standing Balance                      Transfers                                        Gait Training                                            Sitting/Standing Balance  Stair Training [   ]Applicable                 Home equipment Eval                                                                     Splinting  [   ] Only      GOALS:   ADL   [ x   ]   Independent         Transfers  [  x  ] Independent            Ambulation  [  x   ] Independent     [  x   ] With device                            [    ]  CG                                               [    ]  CG                                                    [     ] CG                            [    ] Min A                                          [    ] Min A                                                [     ] Min  A          DISCHARGE PLAN:   [     ]  Good candidate for Intensive Rehabilitation/Hospital based                                             Will tolerate 3hrs Intensive Rehab Daily                                       [   x   ]  Short Term Rehab in Skilled Nursing Facility                                       [      ]  Home with Outpatient or  services                                         [      ]  Possible Candidate for Intensive Hospital based Rehab
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
## 2019-01-08 NOTE — PATIENT PROFILE ADULT - OVER THE PAST TWO WEEKS HAVE YOU FELT DOWN, DEPRESSED OR HOPELESS?
Detail Level: Simple Initiate Treatment: Soak fingertips in warm water daily, apply Vaseline while damp Samples Given: AlphaRet Overnight cream, [place] 0.1 cream to every night at bedtime no

## 2019-01-08 NOTE — H&P ADULT - NSHPPHYSICALEXAM_GEN_ALL_CORE
ICU Vital Signs Last 24 Hrs  T(C): 36 (08 Jan 2019 00:32), Max: 37.7 (07 Jan 2019 21:00)  T(F): 96.8 (08 Jan 2019 00:32), Max: 99.8 (07 Jan 2019 21:00)  HR: 95 (08 Jan 2019 00:32) (95 - 114)  BP: 110/76 (08 Jan 2019 00:32) (103/81 - 110/76)  BP(mean): --  ABP: --  ABP(mean): --  RR: 18 (08 Jan 2019 00:32) (18 - 18)  SpO2: 96% (08 Jan 2019 00:32) (96% - 96%)    PHYSICAL EXAM:  GENERAL: NAD, speaks in full sentences, no signs of respiratory distress  CHEST/LUNG: Clear to auscultation bilaterally; No wheeze; No crackles; No accessory muscles used  HEART: Regular rate and rhythm;  ABDOMEN: Soft, Nontender, Nondistended; Bowel sounds present; No guarding  EXTREMITIES:  2+ Peripheral Pulses, No cyanosis or edema  PSYCH: AAOx3  NEUROLOGY: non-focal ICU Vital Signs Last 24 Hrs  T(C): 36 (08 Jan 2019 00:32), Max: 37.7 (07 Jan 2019 21:00)  T(F): 96.8 (08 Jan 2019 00:32), Max: 99.8 (07 Jan 2019 21:00)  HR: 95 (08 Jan 2019 00:32) (95 - 114)  BP: 110/76 (08 Jan 2019 00:32) (103/81 - 110/76)  BP(mean): --  ABP: --  ABP(mean): --  RR: 18 (08 Jan 2019 00:32) (18 - 18)  SpO2: 96% (08 Jan 2019 00:32) (96% - 96%)    PHYSICAL EXAM:  GENERAL: NAD, speaks in full sentences, no signs of respiratory distress  CHEST/LUNG: Clear to auscultation bilaterally; No wheeze; No crackles; No accessory muscles used  HEART: Regular rate and rhythm;  ABDOMEN: Soft, Nontender, Nondistended; Bowel sounds present; No guarding  EXTREMITIES: 1+ edema  PSYCH: AAOx3  NEUROLOGY: Following commands, moves all extremities, AOX1 to person, not to place and time

## 2019-01-08 NOTE — H&P ADULT - NSHPLABSRESULTS_GEN_ALL_CORE
15.2   7.85  )-----------( 176      ( 07 Jan 2019 19:53 )             46.9       01-07    143  |  100  |  33<H>  ----------------------------<  110<H>  4.1   |  27  |  1.5    Ca    10.1      07 Jan 2019 19:53  Mg     2.1     01-07    TPro  6.9  /  Alb  3.6  /  TBili  0.6  /  DBili  x   /  AST  14  /  ALT  8   /  AlkPhos  56  01-07    Urinalysis (01.07.19 @ 23:30)    Glucose Qualitative, Urine: Negative mg/dL    Blood, Urine: Moderate    pH Urine: 7.0    Color: Yellow    Urine Appearance: Cloudy    Bilirubin: Negative    Ketone - Urine: Negative    Specific Gravity: 1.015    Protein, Urine: 100 mg/dL    Urobilinogen: 0.2 mg/dL    Nitrite: Positive    Leukocyte Esterase Concentration: Large    < from: CT Head No Cont (01.07.19 @ 22:12) >    IMPRESSION:  No evidence of acute intracranial pathology    < end of copied text >

## 2019-01-08 NOTE — ED PROVIDER NOTE - MEDICAL DECISION MAKING DETAILS
I personally evaluated the patient. I reviewed the Resident’s or Physician Assistant’s note (as assigned above), and agree with the findings and plan except as documented in my note. Patient here for ams, ct unremarkable, + UTI, treated and will admit. patient denies chest pain, patient ekg repeated, no stemi.

## 2019-01-08 NOTE — ED PROVIDER NOTE - NS ED ROS FT
I am unable to obtain a comprehensive history, review of systems, past medical history, and/or physical exam due to constraints imposed by the patient's clinical condition and/or mental status.    Constitutional: (-) fever  Eyes/ENT: (-) blurry vision, (-) epistaxis  Cardiovascular: (-) chest pain, (-) syncope  Respiratory: (-) cough, (-) shortness of breath  Gastrointestinal: (-) vomiting, (-) diarrhea  Musculoskeletal: (-) neck pain, (-) back pain, (-) joint pain  Integumentary: (-) rash, (-) edema  Neurological: (-) headache, (-) altered mental status  Psychiatric: (-) hallucinations  Allergic/Immunologic: (-) pruritus

## 2019-01-08 NOTE — H&P ADULT - PMH
Edema of both ankles  3 plus pitting  Edema of both legs  3 plus pitting  High blood cholesterol    Hypertension    Shoulder pain, unspecified chronicity, unspecified laterality  left side as per patient Atrial fibrillation    Dementia    High blood cholesterol    Hypertension    Shoulder pain, unspecified chronicity, unspecified laterality  left side as per patient

## 2019-01-08 NOTE — H&P ADULT - ASSESSMENT
#)Diet:  #)Activity:  #)DVT ppx:  #)GI ppx:  #)Dispo:  #)Code: 88y F PMH Hypothyroid HLD, Afib, CKD, HTN, depression dementia was brought by emergency team for evaluation of AMS.     #)AMS/ Sepsis SIRS (hypothermia, tachycardia) likely due to UTI  -Positive UA, s/p rocephin, 3 litres fluid in ED  -c/w rocephin  -Follow up urine culture, blood culture    #)HTN-controlled  -c/w lopressor    #)A fib rate controlled for now  -c/w aspirin, lopressor    #)DLD  c/w simvastatin    #)Depression  -c/w sertraline    #)Diet: DASH diet  #)Activity: OOBTC  #)DVT ppx: hep SC  #)GI ppx: Not indicated  #)Dispo: From home, lives alone, PT consult    Pt says she has a daughter but she didn't remember the phone number
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
## 2019-01-08 NOTE — ED PROVIDER NOTE - PHYSICAL EXAMINATION
CONST: Pt appears agitated  EYES: PERRL, EOMI, Sclera and conjunctiva clear.  ENT: No nasal discharge. Oropharynx clear with no erythema or exudates. No abscess or swelling. Uvula midline. No temporal artery or mastoid tenderness.  NECK: Non-tender, no meningeal signs. normal ROM. supple   CARD: S1 S2  RESP: Equal BS B/L, No wheezes, rhonchi or rales. No distress  GI: Soft, non-tender, non-distended. no cva tenderness. normal BS  MS: No midline spinal tenderness. pulses 1 +. no calf tenderness or swelling  SKIN: Warm, dry, no acute rashes.   NEURO: Alert to person and place but not time. No focal deficits. Strength 5/5 with no sensory deficits. Finger to nose intact. Negative pronator drift

## 2019-01-08 NOTE — ED PROVIDER NOTE - ATTENDING CONTRIBUTION TO CARE
88y F PMH Hypothyroid, DM, HLD, Afib, CKD, dementia, presents with AMS. Patient has a history of dementia, patient is able to tell us that she does not have chest pain or sob. No obvious a/v hallucinations, sitting with pca on 1:1 and conversing    CONSTITUTIONAL: Well-developed; well-nourished; in no acute distress. Sitting up and providing appropriate history and physical examination  SKIN: skin exam is warm and dry, no acute rash.  HEAD: Normocephalic; atraumatic.  EYES: PERRL, 3 mm bilateral, no nystagmus, EOM intact; conjunctiva and sclera clear.  ENT: No nasal discharge; airway clear.  NECK: Supple; non tender. + full passive ROM in all directions. No JVD  CARD: S1, S2 normal; no murmurs, gallops, or rubs. Regular rate and rhythm. + Symmetric Strong Pulses  RESP: No wheezes, rales or rhonchi. Good air movement bilaterally  ABD: soft; non-distended; non-tender. No Rebound, No Guarding, No signs of peritonitis, No CVA tenderness. No pulsatile abdominal mass. + Strong and Symmetric Pulses  EXT: Normal ROM. No clubbing, cyanosis or edema. Dp and Pt Pulses intact. Cap refill less than 3 seconds  NEURO: Alert, grossly unremarkable. No Focal deficits. GCS 15. NIH 0  PSYCH: Cooperative, appropriate.
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
## 2019-01-09 LAB
ANION GAP SERPL CALC-SCNC: 10 MMOL/L — SIGNIFICANT CHANGE UP (ref 7–14)
BASOPHILS # BLD AUTO: 0.06 K/UL — SIGNIFICANT CHANGE UP (ref 0–0.2)
BASOPHILS NFR BLD AUTO: 1.1 % — HIGH (ref 0–1)
BUN SERPL-MCNC: 30 MG/DL — HIGH (ref 10–20)
CALCIUM SERPL-MCNC: 9.1 MG/DL — SIGNIFICANT CHANGE UP (ref 8.5–10.1)
CHLORIDE SERPL-SCNC: 108 MMOL/L — SIGNIFICANT CHANGE UP (ref 98–110)
CO2 SERPL-SCNC: 27 MMOL/L — SIGNIFICANT CHANGE UP (ref 17–32)
CREAT SERPL-MCNC: 1.1 MG/DL — SIGNIFICANT CHANGE UP (ref 0.7–1.5)
EOSINOPHIL # BLD AUTO: 0.18 K/UL — SIGNIFICANT CHANGE UP (ref 0–0.7)
EOSINOPHIL NFR BLD AUTO: 3.3 % — SIGNIFICANT CHANGE UP (ref 0–8)
FOLATE SERPL-MCNC: 6.4 NG/ML — SIGNIFICANT CHANGE UP
GLUCOSE SERPL-MCNC: 76 MG/DL — SIGNIFICANT CHANGE UP (ref 70–99)
HCT VFR BLD CALC: 41.3 % — SIGNIFICANT CHANGE UP (ref 37–47)
HGB BLD-MCNC: 13.7 G/DL — SIGNIFICANT CHANGE UP (ref 12–16)
IMM GRANULOCYTES NFR BLD AUTO: 0.2 % — SIGNIFICANT CHANGE UP (ref 0.1–0.3)
LYMPHOCYTES # BLD AUTO: 1.25 K/UL — SIGNIFICANT CHANGE UP (ref 1.2–3.4)
LYMPHOCYTES # BLD AUTO: 23 % — SIGNIFICANT CHANGE UP (ref 20.5–51.1)
MAGNESIUM SERPL-MCNC: 2 MG/DL — SIGNIFICANT CHANGE UP (ref 1.8–2.4)
MCHC RBC-ENTMCNC: 31.9 PG — HIGH (ref 27–31)
MCHC RBC-ENTMCNC: 33.2 G/DL — SIGNIFICANT CHANGE UP (ref 32–37)
MCV RBC AUTO: 96.3 FL — SIGNIFICANT CHANGE UP (ref 81–99)
MONOCYTES # BLD AUTO: 0.51 K/UL — SIGNIFICANT CHANGE UP (ref 0.1–0.6)
MONOCYTES NFR BLD AUTO: 9.4 % — HIGH (ref 1.7–9.3)
NEUTROPHILS # BLD AUTO: 3.42 K/UL — SIGNIFICANT CHANGE UP (ref 1.4–6.5)
NEUTROPHILS NFR BLD AUTO: 63 % — SIGNIFICANT CHANGE UP (ref 42.2–75.2)
NRBC # BLD: 0 /100 WBCS — SIGNIFICANT CHANGE UP (ref 0–0)
PLATELET # BLD AUTO: 154 K/UL — SIGNIFICANT CHANGE UP (ref 130–400)
POTASSIUM SERPL-MCNC: 4 MMOL/L — SIGNIFICANT CHANGE UP (ref 3.5–5)
POTASSIUM SERPL-SCNC: 4 MMOL/L — SIGNIFICANT CHANGE UP (ref 3.5–5)
RBC # BLD: 4.29 M/UL — SIGNIFICANT CHANGE UP (ref 4.2–5.4)
RBC # FLD: 13.4 % — SIGNIFICANT CHANGE UP (ref 11.5–14.5)
SODIUM SERPL-SCNC: 145 MMOL/L — SIGNIFICANT CHANGE UP (ref 135–146)
T PALLIDUM AB TITR SER: NEGATIVE — SIGNIFICANT CHANGE UP
TSH SERPL-MCNC: 2.31 UIU/ML — SIGNIFICANT CHANGE UP (ref 0.27–4.2)
VIT B12 SERPL-MCNC: 758 PG/ML — SIGNIFICANT CHANGE UP (ref 232–1245)
WBC # BLD: 5.43 K/UL — SIGNIFICANT CHANGE UP (ref 4.8–10.8)
WBC # FLD AUTO: 5.43 K/UL — SIGNIFICANT CHANGE UP (ref 4.8–10.8)

## 2019-01-09 RX ADMIN — Medication 50 MILLIGRAM(S): at 18:15

## 2019-01-09 RX ADMIN — HEPARIN SODIUM 5000 UNIT(S): 5000 INJECTION INTRAVENOUS; SUBCUTANEOUS at 21:33

## 2019-01-09 RX ADMIN — HEPARIN SODIUM 5000 UNIT(S): 5000 INJECTION INTRAVENOUS; SUBCUTANEOUS at 06:05

## 2019-01-09 RX ADMIN — Medication 81 MILLIGRAM(S): at 12:11

## 2019-01-09 RX ADMIN — SIMVASTATIN 20 MILLIGRAM(S): 20 TABLET, FILM COATED ORAL at 21:33

## 2019-01-09 RX ADMIN — CEFTRIAXONE 100 GRAM(S): 500 INJECTION, POWDER, FOR SOLUTION INTRAMUSCULAR; INTRAVENOUS at 12:25

## 2019-01-09 RX ADMIN — Medication 650 MILLIGRAM(S): at 12:11

## 2019-01-09 RX ADMIN — SERTRALINE 100 MILLIGRAM(S): 25 TABLET, FILM COATED ORAL at 12:11

## 2019-01-09 RX ADMIN — Medication 50 MILLIGRAM(S): at 06:05

## 2019-01-09 RX ADMIN — Medication 650 MILLIGRAM(S): at 18:15

## 2019-01-09 RX ADMIN — CHLORHEXIDINE GLUCONATE 1 APPLICATION(S): 213 SOLUTION TOPICAL at 06:06

## 2019-01-09 RX ADMIN — HEPARIN SODIUM 5000 UNIT(S): 5000 INJECTION INTRAVENOUS; SUBCUTANEOUS at 15:21

## 2019-01-09 NOTE — PROGRESS NOTE ADULT - ATTENDING COMMENTS
Patient seen and examined independently. I agree with the resident's note, physical exam, and plan except as below.  Vital Signs Last 24 Hrs  T(C): 35.7 (09 Jan 2019 15:42), Max: 36.4 (08 Jan 2019 23:36)  T(F): 96.3 (09 Jan 2019 15:42), Max: 97.5 (08 Jan 2019 23:36)  HR: 78 (09 Jan 2019 15:42) (78 - 95)  BP: 121/65 (09 Jan 2019 15:42) (120/76 - 135/83)  BP(mean): --  RR: 16 (09 Jan 2019 15:42) (16 - 18)  SpO2: --  nad  confused  [id number]    #delusion/selerium - ? UTI/cystisit - check blood culture fowwo   #metabolic encephatopthy improving   #debility - snf placement

## 2019-01-09 NOTE — PHYSICAL THERAPY INITIAL EVALUATION ADULT - LEVEL OF INDEPENDENCE: GAIT, REHAB EVAL
min assist with turning for safety and walker mgmt/minimum assist (75% patients effort)/contact guard

## 2019-01-09 NOTE — PROGRESS NOTE ADULT - SUBJECTIVE AND OBJECTIVE BOX
JOSE FLORES 88y Female  MRN#: [id number]     SUBJECTIVE  Patient is a 88y old Female who presents with a chief complaint of AMS (2019 14:57)  Currently admitted to medicine with the primary diagnosis of UTI (urinary tract infection)    Today is hospital day 1d, and this morning she is feeling annoyed with everyone. Does not feel like eating breakfast because no one will listen to her anyway. Otherwise denies headache, nausea/vomiting, chills, chest pain, SOB, dysuria, urinary frequency or burning. States she was not urinating much prior to arrival.     OBJECTIVE  PAST MEDICAL & SURGICAL HISTORY  Dementia  Atrial fibrillation  Hypertension  High blood cholesterol  Shoulder pain, unspecified chronicity, unspecified laterality: left side as per patient  No significant past surgical history    ALLERGIES:  No Known Allergies    MEDICATIONS:  STANDING MEDICATIONS  acetaminophen   Tablet .. 650 milliGRAM(s) Oral every 6 hours  aspirin enteric coated 81 milliGRAM(s) Oral daily  cefTRIAXone   IVPB 1 Gram(s) IV Intermittent every 24 hours  chlorhexidine 4% Liquid 1 Application(s) Topical <User Schedule>  heparin  Injectable 5000 Unit(s) SubCutaneous every 8 hours  metoprolol tartrate 50 milliGRAM(s) Oral two times a day  sertraline 100 milliGRAM(s) Oral daily  simvastatin 20 milliGRAM(s) Oral at bedtime    PRN MEDICATIONS      VITAL SIGNS: Last 24 Hours  T(C): 36.3 (2019 07:41), Max: 36.9 (2019 15:38)  T(F): 97.3 (2019 07:41), Max: 98.4 (2019 15:38)  HR: 95 (2019 07:41) (79 - 95)  BP: 120/76 (2019 07:41) (118/55 - 135/83)  BP(mean): --  RR: 18 (2019 07:41) (18 - 18)  SpO2: --    LABS:                        13.7   5.43  )-----------( 154      ( 2019 06:12 )             41.3     01-09    145  |  108  |  30<H>  ----------------------------<  76  4.0   |  27  |  1.1    Ca    9.1      2019 06:12  Mg     2.0         TPro  6.9  /  Alb  3.6  /  TBili  0.6  /  DBili  x   /  AST  14  /  ALT  8   /  AlkPhos  56      PT/INR - ( 2019 19:53 )   PT: 11.50 sec;   INR: 1.00 ratio         PTT - ( 2019 19:53 )  PTT:31.7 sec  Urinalysis Basic - ( 2019 23:30 )    Color: Yellow / Appearance: Cloudy / S.015 / pH: x  Gluc: x / Ketone: Negative  / Bili: Negative / Urobili: 0.2 mg/dL   Blood: x / Protein: 100 mg/dL / Nitrite: Positive   Leuk Esterase: Large / RBC: 6-10 /HPF / WBC 10-25 /HPF   Sq Epi: x / Non Sq Epi: Few /HPF / Bacteria: Few /HPF            Culture - Urine (collected 2019 23:30)  Source: .Urine Clean Catch (Midstream)  Preliminary Report (2019 07:37):    >100,000 CFU/ml Escherichia coli    Culture - Blood (collected 2019 20:38)  Source: .Blood Blood  Preliminary Report (2019 03:01):    No growth to date.    Culture - Blood (collected 2019 20:38)  Source: .Blood Blood  Preliminary Report (2019 03:01):    No growth to date.      CARDIAC MARKERS ( 2019 19:53 )  x     / <0.01 ng/mL / x     / x     / x          RADIOLOGY:      PHYSICAL EXAM:  GENERAL: NAD, well-developed, AAOx2-3 with intermittent confusion  HEENT:  Atraumatic, Normocephalic. EOMI but keeps eyes closed most of the time  PULMONARY: Clear to auscultation bilaterally; No wheezing or crackles  CARDIOVASCULAR: Irregular rate and rhythm; No murmurs appreciated  GASTROINTESTINAL: Soft, Nontender, Nondistended; Bowel sounds present  EXTREMITIES:  2+ Peripheral Pulses, No clubbing, cyanosis, or edema  GENITOURINARY: Rosa without sedimentation or blood

## 2019-01-09 NOTE — PROGRESS NOTE ADULT - ASSESSMENT
ADMISSION SUMMARY  88y F PMH Hypothyroid HLD, Afib, CKD, HTN, depression dementia was brought by emergency team for evaluation of AMS, found to have positive UA on admission.    ASSESSMENT & PLAN    # Metabolic encephalopathy likely 2/2 acute cystitis on baseline dementia vs. progression of dementia  -Positive UA s/p Rocephin, 3L fluid in ED  -Urine cultures positive for >100,000 E.coli, blood cultures neg x2  -Continue Rocephin 1 g daily (today is 3rd dose) until sensitivities return  -CTH negative for acute pathology on admission, f/u TSH, B12, folate, RPR  -Voiding trial today    # YOLETTE - improved s/p 3L  -Cr 1.5 > 1.1, likely 2/2 dehydration    # A fib - rate controlled  -Continue Lopressor 50 mg BID and ASA  -CHADSVASC 4 - no AC on outpatient medications - likely 2/2 high fall risk with dementia    # HTN - controlled  -Continue Lopressor 50 mg BID    # DLD  -Continue simvastatin 20 mg qHS    # Depression  -Continue sertraline 100 mg daily    # GI ppx  -Diet: DASH diet    # DVT ppx  -Hep SC    # Disposition  -From home, lives alone, rehab consult appreciated - NF ADMISSION SUMMARY  88y F PMH Hypothyroid HLD, Afib, CKD, HTN, depression dementia was brought by emergency team for evaluation of AMS, found to have positive UA on admission.    ASSESSMENT & PLAN    # Metabolic encephalopathy likely 2/2 acute cystitis on baseline dementia vs. progression of dementia  -Positive UA s/p Rocephin, 3L fluid in ED  -Urine cultures positive for >100,000 E.coli, blood cultures neg x2  -Continue Rocephin 1 g daily (today is 3rd dose) until sensitivities return  -CTH negative for acute pathology on admission, f/u TSH, B12, folate, RPR  -Voiding trial today    # YOLETTE - improved s/p 3L  -Cr 1.5 > 1.1, likely 2/2 dehydration    # A fib - rate controlled  -Continue Lopressor 50 mg BID and ASA  -CHADSVASC 4 - no AC on outpatient medications - likely 2/2 high fall risk with dementia    # HTN - controlled  -Continue Lopressor 50 mg BID    # DLD  -Continue simvastatin 20 mg qHS    # Depression  -Continue sertraline 100 mg daily    # GI ppx  -Diet: DASH diet    # DVT ppx  -Hep SC    # Disposition  -From home, lives alone, rehab consult appreciated - short term rehab at SNF

## 2019-01-10 LAB
-  AMIKACIN: SIGNIFICANT CHANGE UP
-  AMOXICILLIN/CLAVULANIC ACID: SIGNIFICANT CHANGE UP
-  AMPICILLIN/SULBACTAM: SIGNIFICANT CHANGE UP
-  AMPICILLIN: SIGNIFICANT CHANGE UP
-  AZTREONAM: SIGNIFICANT CHANGE UP
-  CEFAZOLIN: SIGNIFICANT CHANGE UP
-  CEFEPIME: SIGNIFICANT CHANGE UP
-  CEFOXITIN: SIGNIFICANT CHANGE UP
-  CEFTRIAXONE: SIGNIFICANT CHANGE UP
-  CIPROFLOXACIN: SIGNIFICANT CHANGE UP
-  ERTAPENEM: SIGNIFICANT CHANGE UP
-  GENTAMICIN: SIGNIFICANT CHANGE UP
-  IMIPENEM: SIGNIFICANT CHANGE UP
-  LEVOFLOXACIN: SIGNIFICANT CHANGE UP
-  MEROPENEM: SIGNIFICANT CHANGE UP
-  NITROFURANTOIN: SIGNIFICANT CHANGE UP
-  PIPERACILLIN/TAZOBACTAM: SIGNIFICANT CHANGE UP
-  TIGECYCLINE: SIGNIFICANT CHANGE UP
-  TOBRAMYCIN: SIGNIFICANT CHANGE UP
-  TRIMETHOPRIM/SULFAMETHOXAZOLE: SIGNIFICANT CHANGE UP
ANION GAP SERPL CALC-SCNC: 12 MMOL/L — SIGNIFICANT CHANGE UP (ref 7–14)
BASOPHILS # BLD AUTO: 0.07 K/UL — SIGNIFICANT CHANGE UP (ref 0–0.2)
BASOPHILS NFR BLD AUTO: 1.4 % — HIGH (ref 0–1)
BUN SERPL-MCNC: 26 MG/DL — HIGH (ref 10–20)
CALCIUM SERPL-MCNC: 8.9 MG/DL — SIGNIFICANT CHANGE UP (ref 8.5–10.1)
CHLORIDE SERPL-SCNC: 106 MMOL/L — SIGNIFICANT CHANGE UP (ref 98–110)
CO2 SERPL-SCNC: 25 MMOL/L — SIGNIFICANT CHANGE UP (ref 17–32)
CREAT SERPL-MCNC: 1 MG/DL — SIGNIFICANT CHANGE UP (ref 0.7–1.5)
CULTURE RESULTS: SIGNIFICANT CHANGE UP
EOSINOPHIL # BLD AUTO: 0.26 K/UL — SIGNIFICANT CHANGE UP (ref 0–0.7)
EOSINOPHIL NFR BLD AUTO: 5.1 % — SIGNIFICANT CHANGE UP (ref 0–8)
GLUCOSE SERPL-MCNC: 78 MG/DL — SIGNIFICANT CHANGE UP (ref 70–99)
HCT VFR BLD CALC: 39.8 % — SIGNIFICANT CHANGE UP (ref 37–47)
HGB BLD-MCNC: 13 G/DL — SIGNIFICANT CHANGE UP (ref 12–16)
IMM GRANULOCYTES NFR BLD AUTO: 0.4 % — HIGH (ref 0.1–0.3)
LYMPHOCYTES # BLD AUTO: 1.01 K/UL — LOW (ref 1.2–3.4)
LYMPHOCYTES # BLD AUTO: 19.8 % — LOW (ref 20.5–51.1)
MAGNESIUM SERPL-MCNC: 1.9 MG/DL — SIGNIFICANT CHANGE UP (ref 1.8–2.4)
MCHC RBC-ENTMCNC: 31.5 PG — HIGH (ref 27–31)
MCHC RBC-ENTMCNC: 32.7 G/DL — SIGNIFICANT CHANGE UP (ref 32–37)
MCV RBC AUTO: 96.4 FL — SIGNIFICANT CHANGE UP (ref 81–99)
METHOD TYPE: SIGNIFICANT CHANGE UP
MONOCYTES # BLD AUTO: 0.46 K/UL — SIGNIFICANT CHANGE UP (ref 0.1–0.6)
MONOCYTES NFR BLD AUTO: 9 % — SIGNIFICANT CHANGE UP (ref 1.7–9.3)
NEUTROPHILS # BLD AUTO: 3.29 K/UL — SIGNIFICANT CHANGE UP (ref 1.4–6.5)
NEUTROPHILS NFR BLD AUTO: 64.3 % — SIGNIFICANT CHANGE UP (ref 42.2–75.2)
NRBC # BLD: 0 /100 WBCS — SIGNIFICANT CHANGE UP (ref 0–0)
ORGANISM # SPEC MICROSCOPIC CNT: SIGNIFICANT CHANGE UP
ORGANISM # SPEC MICROSCOPIC CNT: SIGNIFICANT CHANGE UP
PLATELET # BLD AUTO: 160 K/UL — SIGNIFICANT CHANGE UP (ref 130–400)
POTASSIUM SERPL-MCNC: 3.9 MMOL/L — SIGNIFICANT CHANGE UP (ref 3.5–5)
POTASSIUM SERPL-SCNC: 3.9 MMOL/L — SIGNIFICANT CHANGE UP (ref 3.5–5)
RBC # BLD: 4.13 M/UL — LOW (ref 4.2–5.4)
RBC # FLD: 13.7 % — SIGNIFICANT CHANGE UP (ref 11.5–14.5)
SODIUM SERPL-SCNC: 143 MMOL/L — SIGNIFICANT CHANGE UP (ref 135–146)
SPECIMEN SOURCE: SIGNIFICANT CHANGE UP
WBC # BLD: 5.11 K/UL — SIGNIFICANT CHANGE UP (ref 4.8–10.8)
WBC # FLD AUTO: 5.11 K/UL — SIGNIFICANT CHANGE UP (ref 4.8–10.8)

## 2019-01-10 RX ORDER — NITROFURANTOIN MACROCRYSTAL 50 MG
100 CAPSULE ORAL
Qty: 0 | Refills: 0 | Status: DISCONTINUED | OUTPATIENT
Start: 2019-01-10 | End: 2019-01-11

## 2019-01-10 RX ADMIN — Medication 50 MILLIGRAM(S): at 06:04

## 2019-01-10 RX ADMIN — Medication 50 MILLIGRAM(S): at 18:46

## 2019-01-10 RX ADMIN — HEPARIN SODIUM 5000 UNIT(S): 5000 INJECTION INTRAVENOUS; SUBCUTANEOUS at 13:39

## 2019-01-10 RX ADMIN — Medication 81 MILLIGRAM(S): at 11:52

## 2019-01-10 RX ADMIN — SERTRALINE 100 MILLIGRAM(S): 25 TABLET, FILM COATED ORAL at 11:52

## 2019-01-10 RX ADMIN — Medication 650 MILLIGRAM(S): at 18:46

## 2019-01-10 RX ADMIN — CHLORHEXIDINE GLUCONATE 1 APPLICATION(S): 213 SOLUTION TOPICAL at 06:02

## 2019-01-10 RX ADMIN — HEPARIN SODIUM 5000 UNIT(S): 5000 INJECTION INTRAVENOUS; SUBCUTANEOUS at 06:04

## 2019-01-10 RX ADMIN — SIMVASTATIN 20 MILLIGRAM(S): 20 TABLET, FILM COATED ORAL at 21:12

## 2019-01-10 RX ADMIN — Medication 650 MILLIGRAM(S): at 11:53

## 2019-01-10 RX ADMIN — HEPARIN SODIUM 5000 UNIT(S): 5000 INJECTION INTRAVENOUS; SUBCUTANEOUS at 21:12

## 2019-01-10 RX ADMIN — Medication 100 MILLIGRAM(S): at 18:46

## 2019-01-10 NOTE — PROGRESS NOTE ADULT - ATTENDING COMMENTS
Patient seen and examined independently earlier today. Case discussed with housestaff, nursing, social work, patient. I agree with most of the resident's note, physical exam, and plan except as below. Patient feels better. No new complaints.

## 2019-01-10 NOTE — PROGRESS NOTE ADULT - SUBJECTIVE AND OBJECTIVE BOX
JOSE FLORES 88y Female  MRN#: [id number]     SUBJECTIVE  Patient is a 88y old Female who presents with a chief complaint of AMS (09 Jan 2019 11:42)  Currently admitted to medicine with the primary diagnosis of UTI (urinary tract infection)     Today is hospital day 2d, and this morning she is feeling well. Denies any complaints, no headache, nausea/vomiting, abdominal pain, dysuria, diarrhea. Cannot see breakfast morning tray or medications, needs to be given directly.     OBJECTIVE  PAST MEDICAL & SURGICAL HISTORY  Dementia  Atrial fibrillation  Hypertension  High blood cholesterol  Shoulder pain, unspecified chronicity, unspecified laterality: left side as per patient  No significant past surgical history    ALLERGIES:  No Known Allergies    MEDICATIONS:  STANDING MEDICATIONS  acetaminophen   Tablet .. 650 milliGRAM(s) Oral every 6 hours  aspirin enteric coated 81 milliGRAM(s) Oral daily  cefTRIAXone   IVPB 1 Gram(s) IV Intermittent every 24 hours  chlorhexidine 4% Liquid 1 Application(s) Topical <User Schedule>  heparin  Injectable 5000 Unit(s) SubCutaneous every 8 hours  metoprolol tartrate 50 milliGRAM(s) Oral two times a day  sertraline 100 milliGRAM(s) Oral daily  simvastatin 20 milliGRAM(s) Oral at bedtime    PRN MEDICATIONS      VITAL SIGNS: Last 24 Hours  T(C): 35.4 (10 Chetan 2019 06:31), Max: 36 (10 Chetan 2019 00:03)  T(F): 95.7 (10 Chetan 2019 06:31), Max: 96.8 (10 Chetan 2019 00:03)  HR: 70 (10 Chetan 2019 06:31) (66 - 78)  BP: 126/70 (10 Chetan 2019 06:31) (121/62 - 126/70)  BP(mean): --  RR: 18 (10 Chetan 2019 06:31) (16 - 18)  SpO2: --    LABS:                        13.0   5.11  )-----------( 160      ( 10 Chetan 2019 07:41 )             39.8     01-10    143  |  106  |  26<H>  ----------------------------<  78  3.9   |  25  |  1.0    Ca    8.9      10 Chetan 2019 07:41  Mg     1.9     01-10                Culture - Urine (collected 07 Jan 2019 23:30)  Source: .Urine Clean Catch (Midstream)  Final Report (10 Chetan 2019 08:23):    >100,000 CFU/ml Escherichia coli  Organism: Escherichia coli (10 Chetan 2019 08:23)  Organism: Escherichia coli (10 Chetan 2019 08:23)    Culture - Blood (collected 07 Jan 2019 20:38)  Source: .Blood Blood  Preliminary Report (09 Jan 2019 03:01):    No growth to date.    Culture - Blood (collected 07 Jan 2019 20:38)  Source: .Blood Blood  Preliminary Report (09 Jan 2019 03:01):    No growth to date.          RADIOLOGY:      PHYSICAL EXAM:  GENERAL: NAD, well-developed, AAOx2-3 with severe intermittent confusion  HEENT:  Atraumatic, Normocephalic. EOMI but keeps eyes closed most of the time  PULMONARY: Clear to auscultation bilaterally; No wheezing or crackles  CARDIOVASCULAR: Irregular rate and rhythm; No murmurs appreciated  GASTROINTESTINAL: Soft, Nontender, Nondistended; Bowel sounds present  EXTREMITIES:  2+ Peripheral Pulses, No clubbing, cyanosis, or edema JOSE FLORES 88y Female  MRN#: [id number]     SUBJECTIVE  Patient is a 88y old Female who presents with a chief complaint of AMS (09 Jan 2019 11:42)  Currently admitted to medicine with the primary diagnosis of UTI (urinary tract infection)     Today is hospital day 2d, and this morning she is feeling well. Denies any complaints, no headache, nausea/vomiting, abdominal pain, dysuria, diarrhea. Cannot see breakfast morning tray or medications, needs to be given directly.     OBJECTIVE  PAST MEDICAL & SURGICAL HISTORY  Dementia  Atrial fibrillation  Hypertension  High blood cholesterol  Shoulder pain, unspecified chronicity, unspecified laterality: left side as per patient  No significant past surgical history    ALLERGIES:  No Known Allergies    MEDICATIONS:  STANDING MEDICATIONS  acetaminophen   Tablet .. 650 milliGRAM(s) Oral every 6 hours  aspirin enteric coated 81 milliGRAM(s) Oral daily  cefTRIAXone   IVPB 1 Gram(s) IV Intermittent every 24 hours  chlorhexidine 4% Liquid 1 Application(s) Topical <User Schedule>  heparin  Injectable 5000 Unit(s) SubCutaneous every 8 hours  metoprolol tartrate 50 milliGRAM(s) Oral two times a day  sertraline 100 milliGRAM(s) Oral daily  simvastatin 20 milliGRAM(s) Oral at bedtime    PRN MEDICATIONS      VITAL SIGNS: Last 24 Hours  T(C): 35.4 (10 Chetan 2019 06:31), Max: 36 (10 Chetan 2019 00:03)  T(F): 95.7 (10 Chetan 2019 06:31), Max: 96.8 (10 Chetan 2019 00:03)  HR: 70 (10 Chetan 2019 06:31) (66 - 78)  BP: 126/70 (10 Chetan 2019 06:31) (121/62 - 126/70)  BP(mean): --  RR: 18 (10 Chetan 2019 06:31) (16 - 18)  SpO2: --    LABS:                        13.0   5.11  )-----------( 160      ( 10 Chetan 2019 07:41 )             39.8     01-10    143  |  106  |  26<H>  ----------------------------<  78  3.9   |  25  |  1.0    Ca    8.9      10 Chetan 2019 07:41  Mg     1.9     01-10                Culture - Urine (collected 07 Jan 2019 23:30)  Source: .Urine Clean Catch (Midstream)  Final Report (10 Chetan 2019 08:23):    >100,000 CFU/ml Escherichia coli  Organism: Escherichia coli (10 Chetan 2019 08:23)  Organism: Escherichia coli (10 Chetan 2019 08:23)    Culture - Blood (collected 07 Jan 2019 20:38)  Source: .Blood Blood  Preliminary Report (09 Jan 2019 03:01):    No growth to date.    Culture - Blood (collected 07 Jan 2019 20:38)  Source: .Blood Blood  Preliminary Report (09 Jan 2019 03:01):    No growth to date.          RADIOLOGY:      PHYSICAL EXAM:  GENERAL: NAD, well-developed, AAOx1-2 with severe intermittent confusion  HEENT:  Atraumatic, Normocephalic. EOMI but keeps eyes closed most of the time  PULMONARY: Clear to auscultation bilaterally; No wheezing or crackles  CARDIOVASCULAR: Irregular rate and rhythm; No murmurs appreciated  GASTROINTESTINAL: Soft, Nontender, Nondistended; Bowel sounds present  EXTREMITIES:  2+ Peripheral Pulses, No clubbing, cyanosis, or edema

## 2019-01-10 NOTE — PROGRESS NOTE ADULT - ASSESSMENT
ADMISSION SUMMARY  88y F PMH Hypothyroid HLD, Afib, CKD, HTN, depression dementia was brought by emergency team for evaluation of AMS, found to have positive UA on admission.    ASSESSMENT & PLAN    # Metabolic encephalopathy 2/2 acute cystitis on baseline dementia vs. progression of dementia  -Positive UA s/p Rocephin, 3L fluid in ED  -Afebrile, no leukocytosis, vitals stable  -Blood cultures neg x2, urine cultures positive for >100,000 E.coli, sensitive to cipro, nitrofurantoin, Bactrim  -S/p 3 doses Rocephin 1 g daily - will switch to PO today  -CTH negative for acute pathology on admission, TSH 2.31, B12 758, folate 6.4, RPR neg  -Rosa removed yesterday - no problems with urination    # YOLETTE - improved s/p 3L  -Cr 1.5 > 1.1 > 1.0, likely 2/2 dehydration    # A fib - rate controlled  -Continue Lopressor 50 mg BID and ASA  -CHADSVASC 4 - no AC on outpatient medications - likely 2/2 high fall risk with dementia    # HTN - controlled  -Continue Lopressor 50 mg BID    # DLD  -Continue simvastatin 20 mg qHS    # Depression  -Continue sertraline 100 mg daily    # GI ppx  -Diet: DASH diet    # DVT ppx  -Hep SC    # Disposition  -From home, lives alone, dementia, rehab consult appreciated - short term rehab at SNF. Anticipate for tomorrow  -Code status: FULL CODE ADMISSION SUMMARY  88y F PMH Hypothyroid HLD, Afib, CKD, HTN, depression dementia was brought by emergency team for evaluation of AMS, found to have positive UA on admission.    ASSESSMENT & PLAN    # Metabolic encephalopathy 2/2 acute cystitis in the context of baseline dementia  -Positive UA s/p Rocephin, 3L fluid in ED  -Afebrile, no leukocytosis, vitals stable  -Blood cultures neg x2, urine cultures positive for >100,000 E.coli, sensitive to cipro, nitrofurantoin, Bactrim  -S/p 3 doses Rocephin 1 g daily - will switch to PO today  -CTH negative for acute pathology on admission, TSH 2.31, B12 758, folate 6.4, RPR neg  -Rosa removed yesterday - no problems with urination    # YOLETTE - improved s/p 3L  -Cr 1.5 > 1.1 > 1.0, likely 2/2 dehydration    # A fib - rate controlled  -Continue Lopressor 50 mg BID and ASA  -CHADSVASC 4 - no AC on outpatient medications - likely 2/2 high fall risk with dementia    # HTN - controlled  -Continue Lopressor 50 mg BID    # DLD  -Continue simvastatin 20 mg qHS    # Depression  -Continue sertraline 100 mg daily    # GI ppx  -Diet: DASH diet    # DVT ppx  -Hep SC    # Disposition  -From home, lives alone, dementia, rehab consult appreciated - short term rehab at SNF. Anticipate for tomorrow  -Code status: FULL CODE

## 2019-01-11 ENCOUNTER — TRANSCRIPTION ENCOUNTER (OUTPATIENT)
Age: 84
End: 2019-01-11

## 2019-01-11 VITALS
SYSTOLIC BLOOD PRESSURE: 113 MMHG | DIASTOLIC BLOOD PRESSURE: 63 MMHG | HEART RATE: 91 BPM | RESPIRATION RATE: 18 BRPM | TEMPERATURE: 97 F

## 2019-01-11 RX ORDER — SERTRALINE 25 MG/1
1 TABLET, FILM COATED ORAL
Qty: 0 | Refills: 0 | COMMUNITY
Start: 2019-01-11

## 2019-01-11 RX ORDER — ONDANSETRON 8 MG/1
4 TABLET, FILM COATED ORAL ONCE
Qty: 0 | Refills: 0 | Status: COMPLETED | OUTPATIENT
Start: 2019-01-11 | End: 2019-01-11

## 2019-01-11 RX ORDER — QUETIAPINE FUMARATE 200 MG/1
1 TABLET, FILM COATED ORAL
Qty: 30 | Refills: 0 | OUTPATIENT
Start: 2019-01-11 | End: 2019-02-09

## 2019-01-11 RX ORDER — NITROFURANTOIN MACROCRYSTAL 50 MG
1 CAPSULE ORAL
Qty: 0 | Refills: 0 | COMMUNITY
Start: 2019-01-11

## 2019-01-11 RX ADMIN — ONDANSETRON 4 MILLIGRAM(S): 8 TABLET, FILM COATED ORAL at 12:26

## 2019-01-11 RX ADMIN — Medication 650 MILLIGRAM(S): at 06:20

## 2019-01-11 RX ADMIN — HEPARIN SODIUM 5000 UNIT(S): 5000 INJECTION INTRAVENOUS; SUBCUTANEOUS at 06:20

## 2019-01-11 RX ADMIN — SERTRALINE 100 MILLIGRAM(S): 25 TABLET, FILM COATED ORAL at 11:02

## 2019-01-11 RX ADMIN — HEPARIN SODIUM 5000 UNIT(S): 5000 INJECTION INTRAVENOUS; SUBCUTANEOUS at 13:32

## 2019-01-11 RX ADMIN — CHLORHEXIDINE GLUCONATE 1 APPLICATION(S): 213 SOLUTION TOPICAL at 06:19

## 2019-01-11 RX ADMIN — Medication 81 MILLIGRAM(S): at 11:02

## 2019-01-11 RX ADMIN — Medication 650 MILLIGRAM(S): at 11:03

## 2019-01-11 RX ADMIN — Medication 50 MILLIGRAM(S): at 06:20

## 2019-01-11 RX ADMIN — Medication 100 MILLIGRAM(S): at 10:58

## 2019-01-11 NOTE — DISCHARGE NOTE ADULT - PATIENT PORTAL LINK FT
You can access the [facility], offered by [facility], by registering with the following website: [email or web address]

## 2019-01-11 NOTE — DISCHARGE NOTE ADULT - ADDITIONAL INSTRUCTIONS
Please follow up with your primary doctor Dr. Mauro in 1-2 weeks.  Please follow up with ophthalmology clinic on 1/22/19 at 1:30pm, your outpatient facility can arrange for transportation.

## 2019-01-11 NOTE — PROGRESS NOTE ADULT - SUBJECTIVE AND OBJECTIVE BOX
Pt seen and examined independently. No new complaints. Feeling well.    Gen:NAD, AAOx2  CV: S1 S2  Resp: Decreased BS b/l  GI: NT/ND/S +BS  MS: neg c/c/e  Neuro: nonfocal    Stable for d/c to nsg home.  Time spent in completing discharge process and coordinating care 35 minutes.

## 2019-01-11 NOTE — DISCHARGE NOTE ADULT - MEDICATION SUMMARY - MEDICATIONS TO TAKE
I will START or STAY ON the medications listed below when I get home from the hospital:    acetaminophen 325 mg oral tablet  -- 2 tab(s) by mouth every 6 hours, As needed, Moderate Pain (4 - 6)  -- Indication: For Pain control    aspirin 81 mg oral tablet, chewable  -- 1 tab(s) by mouth once a day  -- Indication: For Atrial fibrillation    sertraline 100 mg oral tablet  -- 1 tab(s) by mouth once a day  -- Indication: For Depression    simvastatin 20 mg oral tablet  -- 1 tab(s) by mouth once a day (at bedtime)  -- Indication: For Hyperlipidemia    SEROquel 25 mg oral tablet  -- 1 tab(s) by mouth once a day (at bedtime), As Needed -for agitation   -- Check with your doctor before becoming pregnant.  Do not drink alcoholic beverages when taking this medication.  May cause drowsiness.  Alcohol may intensify this effect.  Use care when operating dangerous machinery.  Obtain medical advice before taking any non-prescription drugs as some may affect the action of this medication.  This drug may impair the ability to drive or operate machinery.  Use care until you become familiar with its effects.    -- Indication: For Hallucinations/agitation    metoprolol tartrate 50 mg oral tablet  -- 1 tab(s) by mouth 2 times a day. Hold for SBP < 105 and HR < 60  -- Indication: For Atrial fibrillation    nitrofurantoin macrocrystals-monohydrate 100 mg oral capsule  -- 1 cap(s) by mouth 2 times a day (with meals), last day 1/11/19  -- Indication: For UTI (urinary tract infection)

## 2019-01-11 NOTE — CHART NOTE - NSCHARTNOTEFT_GEN_A_CORE
<<<RESIDENT DISCHARGE NOTE>>>     JOSE FLORES  MRN-[id number]    VITAL SIGNS:  T(F): 95.7 (01-11-19 @ 06:09), Max: 98 (01-10-19 @ 13:55)  HR: 67 (01-11-19 @ 06:09)  BP: 132/78 (01-11-19 @ 06:09)  SpO2: --      PHYSICAL EXAMINATION:  General: NAD, AAOx2  Head & Neck: NCAT   Pulmonary: Clear to auscultation bilaterally  Cardiovascular: S1S2. No murmurs appreciated  Gastrointestinal/Abdomen & Pelvis: Soft, nontender  Neurologic/Motor: Nonfocal     TEST RESULTS:                        13.0   5.11  )-----------( 160      ( 10 Chetan 2019 07:41 )             39.8       01-10    143  |  106  |  26<H>  ----------------------------<  78  3.9   |  25  |  1.0    Ca    8.9      10 Chetan 2019 07:41  Mg     1.9     01-10        FINAL DISCHARGE INTERVIEW:  Resident(s) Present: (Name: Migdalia Rivera, RN Present: (Name: Robel)    DISCHARGE MEDICATION RECONCILIATION  reviewed with Attending (Name: Dr. Morataya)    DISPOSITION:   [  ] Home,    [  ] Home with Visiting Nursing Services,   [  X  ]  SNF/ NH,    [   ] Acute Rehab (4A),   [   ] Other (Specify:_________)

## 2019-01-11 NOTE — DISCHARGE NOTE ADULT - PLAN OF CARE
Antibiotic therapy Please continue to take antibiotic through to complete a total 5 day course (last dose will be tonight). Medical management Please continue to take medications as above and you may consider adding on Seroquel 25 mg if hallucinations acutely worsen. Please continue to take metoprolol and aspirin as directed above and follow up with your primary doctor. Outpatient follow up We have made an appointment for you at the ophthalmology clinic here on 1/22/19 at 1:30p. Please follow up with them for further management. Please continue to take medications as above and you may consider adding on Seroquel 12.5 mg if hallucinations acutely worsen.

## 2019-01-11 NOTE — DISCHARGE NOTE ADULT - CARE PLAN
Principal Discharge DX:	UTI (urinary tract infection)  Goal:	Antibiotic therapy  Assessment and plan of treatment:	Please continue to take antibiotic through to complete a total 5 day course (last dose will be tonight).  Secondary Diagnosis:	Dementia  Goal:	Medical management  Assessment and plan of treatment:	Please continue to take medications as above and you may consider adding on Seroquel 25 mg if hallucinations acutely worsen.  Secondary Diagnosis:	Atrial fibrillation  Goal:	Medical management  Assessment and plan of treatment:	Please continue to take metoprolol and aspirin as directed above and follow up with your primary doctor.  Secondary Diagnosis:	Decreased vision in both eyes  Goal:	Outpatient follow up  Assessment and plan of treatment:	We have made an appointment for you at the ophthalmology clinic here on 1/22/19 at 1:30p. Please follow up with them for further management. Principal Discharge DX:	UTI (urinary tract infection)  Goal:	Antibiotic therapy  Assessment and plan of treatment:	Please continue to take antibiotic through to complete a total 5 day course (last dose will be tonight).  Secondary Diagnosis:	Dementia  Goal:	Medical management  Assessment and plan of treatment:	Please continue to take medications as above and you may consider adding on Seroquel 12.5 mg if hallucinations acutely worsen.  Secondary Diagnosis:	Atrial fibrillation  Goal:	Medical management  Assessment and plan of treatment:	Please continue to take metoprolol and aspirin as directed above and follow up with your primary doctor.  Secondary Diagnosis:	Decreased vision in both eyes  Goal:	Outpatient follow up  Assessment and plan of treatment:	We have made an appointment for you at the ophthalmology clinic here on 1/22/19 at 1:30p. Please follow up with them for further management.

## 2019-01-11 NOTE — DISCHARGE NOTE ADULT - CARE PROVIDER_API CALL
Gabriella Mauro), Geriatric Medicine; Internal Medicine  [address] [place] [address] [id number]  Phone: [phone]  Fax: [phone]
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
## 2019-01-13 LAB
CULTURE RESULTS: SIGNIFICANT CHANGE UP
CULTURE RESULTS: SIGNIFICANT CHANGE UP
SPECIMEN SOURCE: SIGNIFICANT CHANGE UP
SPECIMEN SOURCE: SIGNIFICANT CHANGE UP

## 2019-01-15 DIAGNOSIS — F03.90 UNSPECIFIED DEMENTIA WITHOUT BEHAVIORAL DISTURBANCE: ICD-10-CM

## 2019-01-15 DIAGNOSIS — N17.9 ACUTE KIDNEY FAILURE, UNSPECIFIED: ICD-10-CM

## 2019-01-15 DIAGNOSIS — E03.9 HYPOTHYROIDISM, UNSPECIFIED: ICD-10-CM

## 2019-01-15 DIAGNOSIS — E78.5 HYPERLIPIDEMIA, UNSPECIFIED: ICD-10-CM

## 2019-01-15 DIAGNOSIS — I12.9 HYPERTENSIVE CHRONIC KIDNEY DISEASE WITH STAGE 1 THROUGH STAGE 4 CHRONIC KIDNEY DISEASE, OR UNSPECIFIED CHRONIC KIDNEY DISEASE: ICD-10-CM

## 2019-01-15 DIAGNOSIS — N18.9 CHRONIC KIDNEY DISEASE, UNSPECIFIED: ICD-10-CM

## 2019-01-15 DIAGNOSIS — R41.82 ALTERED MENTAL STATUS, UNSPECIFIED: ICD-10-CM

## 2019-01-15 DIAGNOSIS — N39.0 URINARY TRACT INFECTION, SITE NOT SPECIFIED: ICD-10-CM

## 2019-01-15 DIAGNOSIS — Z79.82 LONG TERM (CURRENT) USE OF ASPIRIN: ICD-10-CM

## 2019-01-15 DIAGNOSIS — F32.9 MAJOR DEPRESSIVE DISORDER, SINGLE EPISODE, UNSPECIFIED: ICD-10-CM

## 2019-01-15 DIAGNOSIS — R00.0 TACHYCARDIA, UNSPECIFIED: ICD-10-CM

## 2019-01-15 DIAGNOSIS — B96.29 OTHER ESCHERICHIA COLI [E. COLI] AS THE CAUSE OF DISEASES CLASSIFIED ELSEWHERE: ICD-10-CM

## 2019-01-15 DIAGNOSIS — Z28.21 IMMUNIZATION NOT CARRIED OUT BECAUSE OF PATIENT REFUSAL: ICD-10-CM

## 2019-01-15 DIAGNOSIS — G93.41 METABOLIC ENCEPHALOPATHY: ICD-10-CM

## 2019-01-15 DIAGNOSIS — Z87.891 PERSONAL HISTORY OF NICOTINE DEPENDENCE: ICD-10-CM

## 2019-01-15 DIAGNOSIS — I48.91 UNSPECIFIED ATRIAL FIBRILLATION: ICD-10-CM

## 2019-01-15 DIAGNOSIS — M25.512 PAIN IN LEFT SHOULDER: ICD-10-CM

## 2019-01-15 DIAGNOSIS — H54.7 UNSPECIFIED VISUAL LOSS: ICD-10-CM

## 2019-01-22 ENCOUNTER — OUTPATIENT (OUTPATIENT)
Dept: OUTPATIENT SERVICES | Facility: HOSPITAL | Age: 84
LOS: 1 days | Discharge: HOME | End: 2019-01-22

## 2019-01-22 PROBLEM — I48.91 UNSPECIFIED ATRIAL FIBRILLATION: Chronic | Status: ACTIVE | Noted: 2019-01-08

## 2019-01-22 PROBLEM — F03.90 UNSPECIFIED DEMENTIA WITHOUT BEHAVIORAL DISTURBANCE: Chronic | Status: ACTIVE | Noted: 2019-01-08

## 2019-02-01 DIAGNOSIS — H25.813 COMBINED FORMS OF AGE-RELATED CATARACT, BILATERAL: ICD-10-CM

## 2019-02-01 DIAGNOSIS — H01.00B UNSPECIFIED BLEPHARITIS LEFT EYE, UPPER AND LOWER EYELIDS: ICD-10-CM

## 2019-02-01 DIAGNOSIS — H01.00A UNSPECIFIED BLEPHARITIS RIGHT EYE, UPPER AND LOWER EYELIDS: ICD-10-CM

## 2019-02-01 DIAGNOSIS — H02.002 UNSPECIFIED ENTROPION OF RIGHT LOWER EYELID: ICD-10-CM

## 2019-10-13 NOTE — PATIENT PROFILE ADULT - NSPRONUTRITIONRISK_GEN_A_NUR
ICU Vital Signs Last 24 Hrs  T(C): 36.7 (13 Oct 2019 11:12), Max: 36.7 (13 Oct 2019 11:12)  T(F): 98 (13 Oct 2019 11:12), Max: 98 (13 Oct 2019 11:12)  HR: 73 (13 Oct 2019 11:12) (73 - 91)  BP: 136/58 (13 Oct 2019 11:12) (136/58 - 156/85)  RR: 20 (13 Oct 2019 11:12) (20 - 20)  SpO2: 98% (13 Oct 2019 11:12) (97% - 98%)    PHYSICAL EXAM:  GENERAL: NAD  HEENT: Normocephalic;  conjunctivae and sclerae clear; moist mucous membranes;   NECK : supple  CHEST/LUNG: Clear to auscultation bilaterally with good air entry   HEART: S1 S2  regular; no murmurs, gallops or rubs  ABDOMEN: Soft, Nontender, Nondistended; Bowel sounds present  EXTREMITIES: no cyanosis; no edema; no calf tenderness  SKIN: warm and dry; no rash  NERVOUS SYSTEM:  Awake and alert; Oriented  to place, person and time ; no new deficits ICU Vital Signs Last 24 Hrs  T(C): 36.7 (13 Oct 2019 11:12), Max: 36.7 (13 Oct 2019 11:12)  T(F): 98 (13 Oct 2019 11:12), Max: 98 (13 Oct 2019 11:12)  HR: 73 (13 Oct 2019 11:12) (73 - 91)  BP: 136/58 (13 Oct 2019 11:12) (136/58 - 156/85)  RR: 20 (13 Oct 2019 11:12) (20 - 20)  SpO2: 98% (13 Oct 2019 11:12) (97% - 98%)    PHYSICAL EXAM:  GENERAL: NAD  HEENT: Normocephalic;  conjunctivae and sclerae clear; moist mucous membranes;   NECK : supple  CHEST/LUNG: Clear to auscultation bilaterally with good air entry   HEART: S1 S2  regular; no murmurs, gallops or rubs  ABDOMEN: Soft, Tenderness present in LLQ, Nondistended; Bowel sounds present  EXTREMITIES: no cyanosis; no edema; no calf tenderness  SKIN: warm and dry; no rash  NERVOUS SYSTEM:  Awake and alert; Oriented  to place, person and time ; no new deficits No indicators present

## 2019-10-18 NOTE — ED ADULT TRIAGE NOTE - RESPIRATORY RATE (BREATHS/MIN)
Results noted: normal stress echo with no evidence of stress induced ischemia. To be discussed at  with Dr Aguilar on 10/24/19
18

## 2022-11-12 NOTE — ED ADULT NURSE NOTE - FINAL NURSING ELECTRONIC SIGNATURE
Location of patient: [place]    Subjective: Caller states \"diarrhea\"     Current Symptoms: diarrhea was on an atb  but stopped it on Tuesday due to abd pain and now has diarrhea varies in degree of thickness or watery , no blood, no fevers very mild abd pain no dizziness does have some fatigue states does seem better today     Onset: 5 days ago;     Associated Symptoms: NA    Pain Severity: mild pain 3/10    Temperature: none       What has been tried: yogurt     LMP: NA Pregnant: NA    Recommended disposition: See PCP within 24 Hours    Care advice provided, patient verbalizes understanding; denies any other questions or concerns; instructed to call back for any new or worsening symptoms. This triage is a result of a call to [address] [place] [address]. Please do not respond to the triage nurse through this encounter. Any subsequent communication should be directly with the patient.            Reason for Disposition   [1] SEVERE diarrhea (e.g., 7 or more times / day more than normal) AND [2] present > 24 hours (1 day)    Protocols used: Diarrhea-ADULT-
04-Jun-2018 12:23

## 2024-12-12 NOTE — H&P ADULT - ASSESSMENT
[FreeTextEntry1] : Bridget is feeling fatigued and dizzy. She does not take meds on dialysis but after comes home -200. Then takes medication. Dizziness is only today and yesterday.  Patient is a 87y old  Female who presents with a chief complaint of hallucinations (31 Jul 2018 06:35)                                                                                                                                                                                                                                                                                       HEALTH ISSUES - PROBLEM Dx:delerium                                                dementia                                                 hcvd